# Patient Record
Sex: FEMALE | Race: WHITE | NOT HISPANIC OR LATINO
[De-identification: names, ages, dates, MRNs, and addresses within clinical notes are randomized per-mention and may not be internally consistent; named-entity substitution may affect disease eponyms.]

---

## 2017-05-24 ENCOUNTER — CHARTING TRANS (OUTPATIENT)
Dept: OTHER | Age: 27
End: 2017-05-24

## 2017-05-24 ASSESSMENT — PAIN SCALES - GENERAL: PAINLEVEL_OUTOF10: 1

## 2018-11-03 VITALS
DIASTOLIC BLOOD PRESSURE: 60 MMHG | SYSTOLIC BLOOD PRESSURE: 100 MMHG | WEIGHT: 130 LBS | HEIGHT: 65 IN | TEMPERATURE: 97.9 F | HEART RATE: 72 BPM | OXYGEN SATURATION: 98 % | BODY MASS INDEX: 21.66 KG/M2 | RESPIRATION RATE: 16 BRPM

## 2021-01-20 ENCOUNTER — IMMUNIZATION (OUTPATIENT)
Dept: LAB | Age: 31
End: 2021-01-20

## 2021-01-20 DIAGNOSIS — Z23 NEED FOR VACCINATION: Primary | ICD-10-CM

## 2021-01-20 PROCEDURE — 91301 COVID-19 MODERNA VACCINE: CPT

## 2021-01-20 PROCEDURE — 0011A COVID-19 MODERNA VACCINE: CPT

## 2021-02-21 ENCOUNTER — IMMUNIZATION (OUTPATIENT)
Dept: LAB | Age: 31
End: 2021-02-21

## 2021-02-21 DIAGNOSIS — Z23 NEED FOR VACCINATION: Primary | ICD-10-CM

## 2021-02-21 PROCEDURE — 91301 COVID-19 MODERNA VACCINE: CPT | Performed by: NURSE PRACTITIONER

## 2021-02-21 PROCEDURE — 0012A COVID-19 MODERNA VACCINE: CPT | Performed by: NURSE PRACTITIONER

## 2022-12-03 ENCOUNTER — OFFICE VISIT (OUTPATIENT)
Dept: FAMILY MEDICINE CLINIC | Facility: CLINIC | Age: 32
End: 2022-12-03
Payer: COMMERCIAL

## 2022-12-03 VITALS
DIASTOLIC BLOOD PRESSURE: 60 MMHG | SYSTOLIC BLOOD PRESSURE: 99 MMHG | HEART RATE: 73 BPM | BODY MASS INDEX: 27.16 KG/M2 | OXYGEN SATURATION: 96 % | WEIGHT: 163 LBS | HEIGHT: 65 IN

## 2022-12-03 DIAGNOSIS — Z00.00 ADULT GENERAL MEDICAL EXAMINATION: Primary | ICD-10-CM

## 2022-12-03 DIAGNOSIS — F90.9 ATTENTION DEFICIT HYPERACTIVITY DISORDER (ADHD), UNSPECIFIED ADHD TYPE: ICD-10-CM

## 2022-12-03 DIAGNOSIS — J45.30 MILD PERSISTENT ASTHMA WITHOUT COMPLICATION: ICD-10-CM

## 2022-12-03 DIAGNOSIS — F32.A ANXIETY AND DEPRESSION: ICD-10-CM

## 2022-12-03 DIAGNOSIS — F51.01 PRIMARY INSOMNIA: ICD-10-CM

## 2022-12-03 DIAGNOSIS — F41.9 ANXIETY AND DEPRESSION: ICD-10-CM

## 2022-12-03 RX ORDER — ALBUTEROL SULFATE 90 UG/1
2 AEROSOL, METERED RESPIRATORY (INHALATION) EVERY 6 HOURS PRN
Qty: 1 EACH | Refills: 2 | Status: SHIPPED | OUTPATIENT
Start: 2022-12-03

## 2022-12-03 RX ORDER — FLUOXETINE HYDROCHLORIDE 40 MG/1
40 CAPSULE ORAL DAILY
COMMUNITY
Start: 2022-11-17 | End: 2022-12-03

## 2022-12-03 RX ORDER — BUDESONIDE AND FORMOTEROL FUMARATE DIHYDRATE 160; 4.5 UG/1; UG/1
2 AEROSOL RESPIRATORY (INHALATION) 2 TIMES DAILY
COMMUNITY
End: 2022-12-03

## 2022-12-03 RX ORDER — NORETHINDRONE ACETATE AND ETHINYL ESTRADIOL, ETHINYL ESTRADIOL AND FERROUS FUMARATE 1MG-10(24)
1 KIT ORAL DAILY
COMMUNITY
Start: 2022-11-17

## 2022-12-03 RX ORDER — ZOLPIDEM TARTRATE 10 MG/1
10 TABLET ORAL NIGHTLY PRN
COMMUNITY
Start: 2022-11-17

## 2022-12-03 RX ORDER — ALBUTEROL SULFATE 90 UG/1
2 AEROSOL, METERED RESPIRATORY (INHALATION) EVERY 6 HOURS PRN
COMMUNITY
End: 2022-12-03

## 2022-12-03 RX ORDER — CLONAZEPAM 0.5 MG/1
0.5 TABLET ORAL DAILY PRN
COMMUNITY
Start: 2022-11-17

## 2022-12-03 RX ORDER — BUDESONIDE AND FORMOTEROL FUMARATE DIHYDRATE 160; 4.5 UG/1; UG/1
2 AEROSOL RESPIRATORY (INHALATION) 2 TIMES DAILY
Qty: 1 EACH | Refills: 2 | Status: SHIPPED | OUTPATIENT
Start: 2022-12-03

## 2022-12-03 RX ORDER — METHYLPHENIDATE HYDROCHLORIDE 18 MG/1
TABLET, EXTENDED RELEASE ORAL
COMMUNITY
Start: 2022-11-22

## 2022-12-03 RX ORDER — BUDESONIDE AND FORMOTEROL FUMARATE DIHYDRATE 160; 4.5 UG/1; UG/1
2 AEROSOL RESPIRATORY (INHALATION) 2 TIMES DAILY
Qty: 1 EACH | Refills: 2 | Status: SHIPPED | OUTPATIENT
Start: 2022-12-03 | End: 2022-12-03

## 2022-12-03 RX ORDER — FLUOXETINE HYDROCHLORIDE 20 MG/1
40 CAPSULE ORAL EVERY MORNING
COMMUNITY
Start: 2022-10-14

## 2023-01-06 ENCOUNTER — LAB ENCOUNTER (OUTPATIENT)
Dept: LAB | Facility: HOSPITAL | Age: 33
End: 2023-01-06
Attending: NURSE PRACTITIONER
Payer: COMMERCIAL

## 2023-01-06 DIAGNOSIS — Z00.00 ADULT GENERAL MEDICAL EXAMINATION: ICD-10-CM

## 2023-01-06 LAB
ALBUMIN SERPL-MCNC: 4 G/DL (ref 3.4–5)
ALBUMIN/GLOB SERPL: 1 {RATIO} (ref 1–2)
ALP LIVER SERPL-CCNC: 67 U/L
ALT SERPL-CCNC: 15 U/L
ANION GAP SERPL CALC-SCNC: 4 MMOL/L (ref 0–18)
AST SERPL-CCNC: 10 U/L (ref 15–37)
BASOPHILS # BLD AUTO: 0.11 X10(3) UL (ref 0–0.2)
BASOPHILS NFR BLD AUTO: 1.4 %
BILIRUB SERPL-MCNC: 0.3 MG/DL (ref 0.1–2)
BUN BLD-MCNC: 10 MG/DL (ref 7–18)
BUN/CREAT SERPL: 12.2 (ref 10–20)
CALCIUM BLD-MCNC: 9.3 MG/DL (ref 8.5–10.1)
CHLORIDE SERPL-SCNC: 106 MMOL/L (ref 98–112)
CHOLEST SERPL-MCNC: 221 MG/DL (ref ?–200)
CO2 SERPL-SCNC: 28 MMOL/L (ref 21–32)
CREAT BLD-MCNC: 0.82 MG/DL
DEPRECATED RDW RBC AUTO: 45.1 FL (ref 35.1–46.3)
EOSINOPHIL # BLD AUTO: 0.34 X10(3) UL (ref 0–0.7)
EOSINOPHIL NFR BLD AUTO: 4.4 %
ERYTHROCYTE [DISTWIDTH] IN BLOOD BY AUTOMATED COUNT: 12.4 % (ref 11–15)
EST. AVERAGE GLUCOSE BLD GHB EST-MCNC: 105 MG/DL (ref 68–126)
FASTING PATIENT LIPID ANSWER: YES
FASTING STATUS PATIENT QL REPORTED: YES
GFR SERPLBLD BASED ON 1.73 SQ M-ARVRAT: 97 ML/MIN/1.73M2 (ref 60–?)
GLOBULIN PLAS-MCNC: 4 G/DL (ref 2.8–4.4)
GLUCOSE BLD-MCNC: 90 MG/DL (ref 70–99)
HBA1C MFR BLD: 5.3 % (ref ?–5.7)
HCT VFR BLD AUTO: 40.1 %
HDLC SERPL-MCNC: 81 MG/DL (ref 40–59)
HGB BLD-MCNC: 13.3 G/DL
IMM GRANULOCYTES # BLD AUTO: 0.02 X10(3) UL (ref 0–1)
IMM GRANULOCYTES NFR BLD: 0.3 %
LDLC SERPL CALC-MCNC: 128 MG/DL (ref ?–100)
LYMPHOCYTES # BLD AUTO: 2.11 X10(3) UL (ref 1–4)
LYMPHOCYTES NFR BLD AUTO: 27.1 %
MCH RBC QN AUTO: 32.4 PG (ref 26–34)
MCHC RBC AUTO-ENTMCNC: 33.2 G/DL (ref 31–37)
MCV RBC AUTO: 97.8 FL
MONOCYTES # BLD AUTO: 0.39 X10(3) UL (ref 0.1–1)
MONOCYTES NFR BLD AUTO: 5 %
NEUTROPHILS # BLD AUTO: 4.83 X10 (3) UL (ref 1.5–7.7)
NEUTROPHILS # BLD AUTO: 4.83 X10(3) UL (ref 1.5–7.7)
NEUTROPHILS NFR BLD AUTO: 61.8 %
NONHDLC SERPL-MCNC: 140 MG/DL (ref ?–130)
OSMOLALITY SERPL CALC.SUM OF ELEC: 285 MOSM/KG (ref 275–295)
PLATELET # BLD AUTO: 352 10(3)UL (ref 150–450)
POTASSIUM SERPL-SCNC: 4.6 MMOL/L (ref 3.5–5.1)
PROT SERPL-MCNC: 8 G/DL (ref 6.4–8.2)
RBC # BLD AUTO: 4.1 X10(6)UL
SODIUM SERPL-SCNC: 138 MMOL/L (ref 136–145)
TRIGL SERPL-MCNC: 68 MG/DL (ref 30–149)
TSI SER-ACNC: 1.6 MIU/ML (ref 0.36–3.74)
VLDLC SERPL CALC-MCNC: 12 MG/DL (ref 0–30)
WBC # BLD AUTO: 7.8 X10(3) UL (ref 4–11)

## 2023-01-06 PROCEDURE — 85025 COMPLETE CBC W/AUTO DIFF WBC: CPT

## 2023-01-06 PROCEDURE — 36415 COLL VENOUS BLD VENIPUNCTURE: CPT

## 2023-01-06 PROCEDURE — 83036 HEMOGLOBIN GLYCOSYLATED A1C: CPT

## 2023-01-06 PROCEDURE — 80061 LIPID PANEL: CPT

## 2023-01-06 PROCEDURE — 80053 COMPREHEN METABOLIC PANEL: CPT

## 2023-01-06 PROCEDURE — 84443 ASSAY THYROID STIM HORMONE: CPT

## 2023-01-19 ENCOUNTER — OFFICE VISIT (OUTPATIENT)
Dept: FAMILY MEDICINE CLINIC | Facility: CLINIC | Age: 33
End: 2023-01-19
Payer: COMMERCIAL

## 2023-01-19 VITALS
OXYGEN SATURATION: 98 % | BODY MASS INDEX: 26.33 KG/M2 | SYSTOLIC BLOOD PRESSURE: 108 MMHG | HEIGHT: 65 IN | TEMPERATURE: 99 F | HEART RATE: 81 BPM | DIASTOLIC BLOOD PRESSURE: 76 MMHG | WEIGHT: 158 LBS

## 2023-01-19 DIAGNOSIS — F15.93 WITHDRAWAL FROM OTHER STIMULANT DRUG (HCC): ICD-10-CM

## 2023-01-19 DIAGNOSIS — R51.9 DAILY HEADACHE: Primary | ICD-10-CM

## 2023-01-19 DIAGNOSIS — R42 VERTIGO: ICD-10-CM

## 2023-01-19 PROCEDURE — 3078F DIAST BP <80 MM HG: CPT | Performed by: NURSE PRACTITIONER

## 2023-01-19 PROCEDURE — 3074F SYST BP LT 130 MM HG: CPT | Performed by: NURSE PRACTITIONER

## 2023-01-19 PROCEDURE — 99214 OFFICE O/P EST MOD 30 MIN: CPT | Performed by: NURSE PRACTITIONER

## 2023-01-19 PROCEDURE — 3008F BODY MASS INDEX DOCD: CPT | Performed by: NURSE PRACTITIONER

## 2023-01-19 RX ORDER — FLUTICASONE PROPIONATE 50 MCG
2 SPRAY, SUSPENSION (ML) NASAL DAILY
Qty: 1 EACH | Refills: 0 | Status: SHIPPED | OUTPATIENT
Start: 2023-01-19

## 2023-01-19 RX ORDER — ZOLPIDEM TARTRATE 12.5 MG/1
12.5 TABLET, FILM COATED, EXTENDED RELEASE ORAL NIGHTLY PRN
COMMUNITY
Start: 2022-12-16

## 2023-01-19 RX ORDER — MECLIZINE HCL 12.5 MG/1
12.5 TABLET ORAL 3 TIMES DAILY PRN
Qty: 30 TABLET | Refills: 0 | Status: SHIPPED | OUTPATIENT
Start: 2023-01-19

## 2023-01-19 RX ORDER — METHYLPREDNISOLONE 4 MG/1
TABLET ORAL
Qty: 1 EACH | Refills: 0 | Status: SHIPPED | OUTPATIENT
Start: 2023-01-19

## 2023-01-19 RX ORDER — METHYLPHENIDATE HYDROCHLORIDE 54 MG/1
TABLET ORAL
COMMUNITY
Start: 2022-12-26

## 2023-01-19 RX ORDER — AMOXICILLIN AND CLAVULANATE POTASSIUM 875; 125 MG/1; MG/1
1 TABLET, FILM COATED ORAL 2 TIMES DAILY WITH MEALS
COMMUNITY
Start: 2023-01-14

## 2023-01-26 ENCOUNTER — OFFICE VISIT (OUTPATIENT)
Dept: FAMILY MEDICINE CLINIC | Facility: CLINIC | Age: 33
End: 2023-01-26
Payer: COMMERCIAL

## 2023-01-26 VITALS
DIASTOLIC BLOOD PRESSURE: 82 MMHG | WEIGHT: 156.81 LBS | OXYGEN SATURATION: 98 % | BODY MASS INDEX: 26.13 KG/M2 | RESPIRATION RATE: 18 BRPM | SYSTOLIC BLOOD PRESSURE: 106 MMHG | HEIGHT: 65 IN | HEART RATE: 72 BPM

## 2023-01-26 DIAGNOSIS — L56.8 PHOTOSENSITIVITY: ICD-10-CM

## 2023-01-26 DIAGNOSIS — R42 VERTIGO: Primary | ICD-10-CM

## 2023-01-26 DIAGNOSIS — G47.19 EXCESSIVE DAYTIME SLEEPINESS: ICD-10-CM

## 2023-01-26 PROCEDURE — 99214 OFFICE O/P EST MOD 30 MIN: CPT | Performed by: NURSE PRACTITIONER

## 2023-01-26 PROCEDURE — 3074F SYST BP LT 130 MM HG: CPT | Performed by: NURSE PRACTITIONER

## 2023-01-26 PROCEDURE — 3008F BODY MASS INDEX DOCD: CPT | Performed by: NURSE PRACTITIONER

## 2023-01-26 PROCEDURE — 3079F DIAST BP 80-89 MM HG: CPT | Performed by: NURSE PRACTITIONER

## 2023-04-10 ENCOUNTER — OFFICE VISIT (OUTPATIENT)
Dept: FAMILY MEDICINE CLINIC | Facility: CLINIC | Age: 33
End: 2023-04-10
Payer: COMMERCIAL

## 2023-04-10 ENCOUNTER — HOSPITAL ENCOUNTER (OUTPATIENT)
Dept: GENERAL RADIOLOGY | Age: 33
Discharge: HOME | End: 2023-04-10
Attending: NURSE PRACTITIONER
Payer: COMMERCIAL

## 2023-04-10 VITALS
BODY MASS INDEX: 25.16 KG/M2 | HEART RATE: 105 BPM | DIASTOLIC BLOOD PRESSURE: 72 MMHG | RESPIRATION RATE: 18 BRPM | TEMPERATURE: 98 F | HEIGHT: 65 IN | WEIGHT: 151 LBS | OXYGEN SATURATION: 97 % | SYSTOLIC BLOOD PRESSURE: 118 MMHG

## 2023-04-10 DIAGNOSIS — K59.01 SLOW TRANSIT CONSTIPATION: Primary | ICD-10-CM

## 2023-04-10 DIAGNOSIS — Z01.84 IMMUNITY STATUS TESTING: ICD-10-CM

## 2023-04-10 DIAGNOSIS — R10.84 GENERALIZED ABDOMINAL PAIN: ICD-10-CM

## 2023-04-10 DIAGNOSIS — K59.01 SLOW TRANSIT CONSTIPATION: ICD-10-CM

## 2023-04-10 PROCEDURE — 3078F DIAST BP <80 MM HG: CPT | Performed by: NURSE PRACTITIONER

## 2023-04-10 PROCEDURE — 3008F BODY MASS INDEX DOCD: CPT | Performed by: NURSE PRACTITIONER

## 2023-04-10 PROCEDURE — 74018 RADEX ABDOMEN 1 VIEW: CPT | Performed by: NURSE PRACTITIONER

## 2023-04-10 PROCEDURE — 3074F SYST BP LT 130 MM HG: CPT | Performed by: NURSE PRACTITIONER

## 2023-04-10 PROCEDURE — 99213 OFFICE O/P EST LOW 20 MIN: CPT | Performed by: NURSE PRACTITIONER

## 2023-04-10 NOTE — PATIENT INSTRUCTIONS
COLON CLEANSE:    Recommend starting with 1-2 Fleets enemas (or generic equivalent) as per package instructions. Start with one enema, if no significant bowel movement within 3-4 hours, repeat enema. (An alternative to the enema is Glycerine suppositories.)    If bowel movement occurs then drink 1/2-1 bottle of magnesium citrate. If no significant stool cleanse or bowel movements occur may repeat process the next day. If colon cleanse successful, start MiraLAX over-the-counter laxative (or generic equivalent), twice a day for 2 weeks then continue for maintenance daily to twice a day as needed. (You may try glycerine suppositories as well in place of or in addition to the enemas if preferred.)    Increase your fluid and dietary fiber intake. You may take over-the-counter fiber supplements such as Benefiber, Citrucel or Metamucil powder. Follow-up as needed.     (All the above recommendations are available over-the-counter.)

## 2023-04-14 ENCOUNTER — TELEPHONE (OUTPATIENT)
Dept: FAMILY MEDICINE CLINIC | Facility: CLINIC | Age: 33
End: 2023-04-14

## 2023-04-14 NOTE — TELEPHONE ENCOUNTER
I spoke with pt, discussed she has been constipated x 6 weeks. Saw Beryle Argyle on Monday, colon cleanse recommended. 48 hrs ago, started pooping nonstop. Watery diarrhea started around wed 3 pm. Stomach crampy, denies lightheadedness or dizziness. Yesterday bm 4 times - 3 for 10 min. Today once. Miralax with taco seeds - 1 cap bid, started Monday night. [employer] brand benefiber - 1 tablespoon bid. Has another OTC fiber supplement. Takes mag citrate tabs daily since monday. Had about 3 bottle of mag citrate since wed. Advised to stop all mag citrate. Continue miralax bid, benefiber once daily. And do either taco seeds or other fiber supplement. If any blood in stool, worsening diarrhea, syncope - to ER. Otherwise, call us on Monday with update. Patient verbalizes understanding and agrees with plan.

## 2023-04-17 ENCOUNTER — LAB ENCOUNTER (OUTPATIENT)
Dept: LAB | Age: 33
End: 2023-04-17
Attending: NURSE PRACTITIONER
Payer: COMMERCIAL

## 2023-04-17 DIAGNOSIS — Z01.84 IMMUNITY STATUS TESTING: ICD-10-CM

## 2023-04-17 LAB
HBV SURFACE AB SER QL: REACTIVE
HBV SURFACE AB SERPL IA-ACNC: 550.32 MIU/ML
RUBV IGG SER QL: POSITIVE
RUBV IGG SER-ACNC: 93.7 IU/ML (ref 10–?)

## 2023-04-17 PROCEDURE — 86706 HEP B SURFACE ANTIBODY: CPT | Performed by: NURSE PRACTITIONER

## 2023-04-17 PROCEDURE — 86480 TB TEST CELL IMMUN MEASURE: CPT | Performed by: NURSE PRACTITIONER

## 2023-04-17 PROCEDURE — 86735 MUMPS ANTIBODY: CPT | Performed by: NURSE PRACTITIONER

## 2023-04-17 PROCEDURE — 86762 RUBELLA ANTIBODY: CPT | Performed by: NURSE PRACTITIONER

## 2023-04-17 PROCEDURE — 86765 RUBEOLA ANTIBODY: CPT | Performed by: NURSE PRACTITIONER

## 2023-04-17 PROCEDURE — 86787 VARICELLA-ZOSTER ANTIBODY: CPT | Performed by: NURSE PRACTITIONER

## 2023-04-19 LAB
M TB IFN-G CD4+ T-CELLS BLD-ACNC: 0 IU/ML
M TB TUBERC IFN-G BLD QL: NEGATIVE
M TB TUBERC IGNF/MITOGEN IGNF CONTROL: >10 IU/ML
MEV IGG SER-ACNC: 102 AU/ML (ref 16.5–?)
MUV IGG SER IA-ACNC: 40.8 AU/ML (ref 11–?)
QFT TB1 AG MINUS NIL: 0.05 IU/ML
QFT TB2 AG MINUS NIL: 0.05 IU/ML
VZV IGG SER IA-ACNC: 470.1 (ref 165–?)

## 2023-04-20 ENCOUNTER — TELEPHONE (OUTPATIENT)
Dept: FAMILY MEDICINE CLINIC | Facility: CLINIC | Age: 33
End: 2023-04-20

## 2023-04-20 NOTE — TELEPHONE ENCOUNTER
Please notify patient her sleep study is not approved by insurance. I attempted a [id number] but they are not able to approve it. I recommend she keep her appointment with Dr. Rae Santamaria to discuss her sleep issues and he may be able to get that or a different test approved for her in the future. Thanks.

## 2023-05-08 ENCOUNTER — HOSPITAL ENCOUNTER (OUTPATIENT)
Dept: GENERAL RADIOLOGY | Age: 33
Discharge: HOME | End: 2023-05-08
Attending: NURSE PRACTITIONER
Payer: COMMERCIAL

## 2023-05-08 ENCOUNTER — OFFICE VISIT (OUTPATIENT)
Dept: FAMILY MEDICINE CLINIC | Facility: CLINIC | Age: 33
End: 2023-05-08
Payer: COMMERCIAL

## 2023-05-08 VITALS
SYSTOLIC BLOOD PRESSURE: 106 MMHG | WEIGHT: 155.38 LBS | BODY MASS INDEX: 25.89 KG/M2 | OXYGEN SATURATION: 99 % | RESPIRATION RATE: 18 BRPM | TEMPERATURE: 98 F | HEART RATE: 90 BPM | HEIGHT: 65 IN | DIASTOLIC BLOOD PRESSURE: 80 MMHG

## 2023-05-08 DIAGNOSIS — R06.09 OTHER FORM OF DYSPNEA: ICD-10-CM

## 2023-05-08 DIAGNOSIS — R07.89 CHEST WALL PAIN: ICD-10-CM

## 2023-05-08 DIAGNOSIS — M54.2 NECK PAIN: Primary | ICD-10-CM

## 2023-05-08 PROCEDURE — 99213 OFFICE O/P EST LOW 20 MIN: CPT | Performed by: NURSE PRACTITIONER

## 2023-05-08 PROCEDURE — 3074F SYST BP LT 130 MM HG: CPT | Performed by: NURSE PRACTITIONER

## 2023-05-08 PROCEDURE — 3079F DIAST BP 80-89 MM HG: CPT | Performed by: NURSE PRACTITIONER

## 2023-05-08 PROCEDURE — 71046 X-RAY EXAM CHEST 2 VIEWS: CPT | Performed by: NURSE PRACTITIONER

## 2023-05-08 PROCEDURE — 3008F BODY MASS INDEX DOCD: CPT | Performed by: NURSE PRACTITIONER

## 2023-05-08 RX ORDER — LISDEXAMFETAMINE DIMESYLATE 20 MG/1
20 CAPSULE ORAL EVERY MORNING
COMMUNITY
Start: 2023-04-10

## 2023-05-08 RX ORDER — INDOMETHACIN 50 MG/1
50 CAPSULE ORAL 2 TIMES DAILY
COMMUNITY
Start: 2023-05-04 | End: 2023-05-08

## 2023-05-08 RX ORDER — HYDROCODONE BITARTRATE AND ACETAMINOPHEN 10; 325 MG/1; MG/1
1 TABLET ORAL EVERY 6 HOURS PRN
COMMUNITY
Start: 2023-05-04

## 2023-05-08 RX ORDER — IBUPROFEN 600 MG/1
600 TABLET ORAL EVERY 8 HOURS PRN
Qty: 30 TABLET | Refills: 0 | Status: SHIPPED | OUTPATIENT
Start: 2023-05-08

## 2023-05-08 RX ORDER — CHLORZOXAZONE 375 MG/1
1 TABLET ORAL EVERY 8 HOURS PRN
COMMUNITY
Start: 2023-05-04

## 2023-05-12 ENCOUNTER — APPOINTMENT (OUTPATIENT)
Dept: OTHER | Facility: HOSPITAL | Age: 33
End: 2023-05-12
Attending: PREVENTIVE MEDICINE

## 2023-05-17 ENCOUNTER — OFFICE VISIT (OUTPATIENT)
Dept: NEUROLOGY | Facility: CLINIC | Age: 33
End: 2023-05-17
Payer: COMMERCIAL

## 2023-05-17 VITALS
HEART RATE: 65 BPM | DIASTOLIC BLOOD PRESSURE: 75 MMHG | WEIGHT: 155 LBS | HEIGHT: 65 IN | BODY MASS INDEX: 25.83 KG/M2 | SYSTOLIC BLOOD PRESSURE: 110 MMHG

## 2023-05-17 DIAGNOSIS — G43.111 INTRACTABLE MIGRAINE WITH AURA WITH STATUS MIGRAINOSUS: ICD-10-CM

## 2023-05-17 DIAGNOSIS — G43.809 VESTIBULAR MIGRAINE: Primary | ICD-10-CM

## 2023-05-17 DIAGNOSIS — R53.83 OTHER FATIGUE: ICD-10-CM

## 2023-05-17 DIAGNOSIS — G47.10 HYPERSOMNIA: ICD-10-CM

## 2023-05-17 PROCEDURE — 3008F BODY MASS INDEX DOCD: CPT | Performed by: OTHER

## 2023-05-17 PROCEDURE — 3074F SYST BP LT 130 MM HG: CPT | Performed by: OTHER

## 2023-05-17 PROCEDURE — 3078F DIAST BP <80 MM HG: CPT | Performed by: OTHER

## 2023-05-17 PROCEDURE — 99204 OFFICE O/P NEW MOD 45 MIN: CPT | Performed by: OTHER

## 2023-07-30 ENCOUNTER — HOSPITAL ENCOUNTER (OUTPATIENT)
Age: 33
Discharge: HOME | End: 2023-07-30
Payer: COMMERCIAL

## 2023-07-30 ENCOUNTER — APPOINTMENT (OUTPATIENT)
Dept: GENERAL RADIOLOGY | Age: 33
End: 2023-07-30
Attending: NURSE PRACTITIONER
Payer: COMMERCIAL

## 2023-07-30 VITALS
SYSTOLIC BLOOD PRESSURE: 113 MMHG | RESPIRATION RATE: 18 BRPM | HEART RATE: 85 BPM | OXYGEN SATURATION: 100 % | DIASTOLIC BLOOD PRESSURE: 72 MMHG | TEMPERATURE: 98 F

## 2023-07-30 DIAGNOSIS — R07.81 RIB PAIN: Primary | ICD-10-CM

## 2023-07-30 DIAGNOSIS — S22.31XA CLOSED FRACTURE OF ONE RIB OF RIGHT SIDE, INITIAL ENCOUNTER: ICD-10-CM

## 2023-07-30 PROCEDURE — 99213 OFFICE O/P EST LOW 20 MIN: CPT | Performed by: NURSE PRACTITIONER

## 2023-07-30 PROCEDURE — 71101 X-RAY EXAM UNILAT RIBS/CHEST: CPT | Performed by: NURSE PRACTITIONER

## 2023-07-30 NOTE — ED INITIAL ASSESSMENT (HPI)
Pt states her R posterior rib popped out for the 3rd time in past 1 mos. States chiropractor popped it back in place 1 week ago. States same rib popped out yesterday. No injury or falls. No SOB.

## 2023-07-31 ENCOUNTER — TELEPHONE (OUTPATIENT)
Dept: ORTHOPEDICS CLINIC | Facility: CLINIC | Age: 33
End: 2023-07-31

## 2023-07-31 ENCOUNTER — OFFICE VISIT (OUTPATIENT)
Dept: FAMILY MEDICINE CLINIC | Facility: CLINIC | Age: 33
End: 2023-07-31
Payer: COMMERCIAL

## 2023-07-31 VITALS
OXYGEN SATURATION: 98 % | TEMPERATURE: 98 F | HEART RATE: 93 BPM | HEIGHT: 65 IN | RESPIRATION RATE: 16 BRPM | SYSTOLIC BLOOD PRESSURE: 100 MMHG | WEIGHT: 151.81 LBS | BODY MASS INDEX: 25.29 KG/M2 | DIASTOLIC BLOOD PRESSURE: 64 MMHG

## 2023-07-31 DIAGNOSIS — R07.81 RIB PAIN: ICD-10-CM

## 2023-07-31 DIAGNOSIS — S22.31XD CLOSED FRACTURE OF ONE RIB OF RIGHT SIDE WITH ROUTINE HEALING, SUBSEQUENT ENCOUNTER: Primary | ICD-10-CM

## 2023-07-31 PROCEDURE — 3008F BODY MASS INDEX DOCD: CPT | Performed by: NURSE PRACTITIONER

## 2023-07-31 PROCEDURE — 99213 OFFICE O/P EST LOW 20 MIN: CPT | Performed by: NURSE PRACTITIONER

## 2023-07-31 PROCEDURE — 3074F SYST BP LT 130 MM HG: CPT | Performed by: NURSE PRACTITIONER

## 2023-07-31 PROCEDURE — 3078F DIAST BP <80 MM HG: CPT | Performed by: NURSE PRACTITIONER

## 2023-07-31 RX ORDER — NAPROXEN 500 MG/1
500 TABLET ORAL 2 TIMES DAILY WITH MEALS
Qty: 28 TABLET | Refills: 0 | Status: SHIPPED | OUTPATIENT
Start: 2023-07-31 | End: 2023-08-14

## 2023-08-03 ENCOUNTER — OFFICE VISIT (OUTPATIENT)
Dept: ORTHOPEDICS CLINIC | Facility: CLINIC | Age: 33
End: 2023-08-03

## 2023-08-03 DIAGNOSIS — S22.31XA CLOSED FRACTURE OF ONE RIB OF RIGHT SIDE, INITIAL ENCOUNTER: Primary | ICD-10-CM

## 2023-08-03 PROCEDURE — 99244 OFF/OP CNSLTJ NEW/EST MOD 40: CPT | Performed by: ORTHOPAEDIC SURGERY

## 2023-08-03 NOTE — PROGRESS NOTES
NURSING INTAKE COMMENTS: Patient presents with:  Fracture: Consult for Fx 10 th Rib Right side, T6 popping out . 3 weeks ago Felt pain on right lower scapula. XR in system. right shoulder  Pain 3-9/10  with decrease ROM. On and off tingling-No injury- XR in system      HPI: This 28year old female presents today with complaints of right shoulder thoracic back and rib pain. Symptoms started about 3 to 4 weeks ago. No specific injury. She saw chiropractor and had 2 separate rib adjustments which seemed to help temporarily. She feels though something is clicking in and out of place along the thoracic spine. 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 She is right-hand dominant. No numbness or tingling. She feels posterior shoulder and rib pain when reaching outward and overhead. She feels she may have injured the area during intercourse. Past Medical History:   Diagnosis Date    ADHD     Anxiety     Asthma     Depression      Past Surgical History:   Procedure Laterality Date    KNEE SURGERY Right      Current Outpatient Medications   Medication Sig Dispense Refill    naproxen 500 MG Oral Tab Take 1 tablet (500 mg total) by mouth 2 (two) times daily with meals for 14 days. 28 tablet 0    VYVANSE 20 MG Oral Cap Take 1 capsule (20 mg total) by mouth every morning. Zolpidem Tartrate ER 12.5 MG Oral Tab CR Take 1 tablet (12.5 mg total) by mouth nightly as needed. AT BEDTIME      clonazePAM 0.5 MG Oral Tab Take 1 tablet (0.5 mg total) by mouth daily as needed. FLUoxetine 20 MG Oral Cap Take 2 capsules (40 mg total) by mouth every morning. LO LOESTRIN FE 1 MG-10 MCG / 10 MCG Oral Tab Take 1 tablet by mouth daily. albuterol 108 (90 Base) MCG/ACT Inhalation Aero Soln Inhale 2 puffs into the lungs every 6 (six) hours as needed for Wheezing or Shortness of Breath. 1 each 2    Budesonide-Formoterol Fumarate 160-4.5 MCG/ACT Inhalation Aerosol Inhale 2 puffs into the lungs 2 (two) times daily.  1 each 2     No Known Allergies  History reviewed. No pertinent family history. Social History    Occupational History      Not on file    Tobacco Use      Smoking status: Never      Smokeless tobacco: Never    Vaping Use      Vaping Use: Never used    Substance and Sexual Activity      Alcohol use: Yes        Comment: occ      Drug use: Never      Sexual activity: Not on file       Review of Systems:  GENERAL: denies fevers, chills, night sweats, fatigue, unintentional weight loss/gain  SKIN: denies skin lesions, open sores, rash  HEENT:denies recent vision change, new nasal congestion,hearing loss, tinnitus, sore throat, headaches  RESPIRATORY: denies new shortness of breath, cough, asthma, wheezing  CARDIOVASCULAR: denies chest pain, leg cramps with exertion, palpitations, leg swelling  GI: denies abdominal pain, nausea, vomiting, diarrhea, constipation, hematochezia, worsening heartburn or stomach ulcers  : denies dysuria, hematuria, incontinence, increased frequency, urgency, difficulty urinating  MUSCULOSKELETAL: denies musculoskeletal complaints other than in HPI  NEURO: denies numbness, tingling, weakness, balance issues, dizziness, memory loss  PSYCHIATRIC: denies Hx of depression, anxiety, other psychiatric disorders  HEMATOLOGIC: denies blood clots, anemia, blood clotting disorders, blood transfusion  ENDOCRINE: denies autoimmune disease, thyroid issues, or diabetes  ALLERGY: denies asthma, seasonal allergies    Physical Examination:    LMP 07/09/2023 (Approximate)   Constitutional: appears well hydrated, alert and responsive, no acute distress noted  Extremities: Right shoulder nontender nonswollen. AC joint and clavicle nontender. Scapula and optically tender. Periscapular musculature minimally tender to palpation. Minimal tenderness over the serratus musculature and posterior ribs in the rib 8 through 12 region on the right. No palpable crepitus.   Right shoulder range of motion and strength is normal.  Neurological: Right hand light touch and pinprick sensory exam normal. Lateral shoulder light touch and pinprick sensory examination normal.  strength,wrist extension, biceps, triceps, and shoulder abduction strength 5 out of 5. Alondra sign negative. No obvious atrophy of the hand. Imaging:   XR RIBS WITH CHEST (3 VIEWS), RIGHT  (CPT=71101)    Result Date: 7/30/2023  PROCEDURE: XR RIBS WITH CHEST (3 VIEWS), RIGHT (CPT=71101)  COMPARISON: Former[facility] named [facility], XR CHEST PA + LAT CHEST (TDD=[id number]), 5/08/2023, 5:03 PM.  INDICATIONS: Intermittent right-sided mid back pain for 3 weeks. No known injury. TECHNIQUE:   Four views. FINDINGS:   BONES: Nondisplaced fracture posterior margin right 10th rib SOFT TISSUES: No acute cardiopulmonary disease LUNGS: Normal  PLEURA: Normal. OTHER: S-shaped scoliosis dorsal spine         CONCLUSION:  1. No acute cardiopulmonary disease. 2. Nondisplaced fracture posterior margin right 10th rib    Dictated by (CST): Chino Patton MD on 7/30/2023 at 4:05 PM     Finalized by (CST): Chino Patton MD on 7/30/2023 at 4:07 PM             Labs:  Lab Results   Component Value Date    WBC 7.8 01/06/2023    HGB 13.3 01/06/2023    .0 01/06/2023      Lab Results   Component Value Date    GLU 90 01/06/2023    BUN 10 01/06/2023    CREATSERUM 0.82 01/06/2023        Assessment and Plan:  Diagnoses and all orders for this visit:    Closed fracture of one rib of right side, initial encounter        Assessment: Right-sided rib pain, findings suggestive of 1/10 rib fracture on the right, serratus strain    Plan: I advised no specific treatment at this time. Advised against lifting pushing and pulling with the extremity. Provided an off work note for the next 3 weeks. Follow-up again in 3 weeks at which time we will likely recommend some outpatient physical therapy. Advised icing, oral anti-inflammatory use for now.   She may use a sling for comfort. Follow Up: Return in about 3 weeks (around 8/24/2023).     Tressa Gonsalves MD

## 2023-09-27 ENCOUNTER — NURSE TRIAGE (OUTPATIENT)
Dept: FAMILY MEDICINE CLINIC | Facility: CLINIC | Age: 33
End: 2023-09-27

## 2023-09-27 NOTE — TELEPHONE ENCOUNTER
Patient called and LVM. Patient is covid positive. RN LVM for patient to call office back for triage.

## 2023-09-28 NOTE — TELEPHONE ENCOUNTER
S/w Mine who stated after triage she had \" some chest tightness due to her asthma. \" Pt denied using short acting inhaler. Pt directed to  for evaluation but also informed if worsening chest tightness or SOB to proceed to the ED. Reason for Disposition   Chest pain or pressure  (Exception: MILD central chest pain, present only when coughing)    Answer Assessment - Initial Assessment Questions  1. COVID-19 DIAGNOSIS: \"Who made your COVID-19 diagnosis? \" \"Was it confirmed by a positive lab test or self-test?\" If not diagnosed by a doctor (or NP/PA), ask \"Are there lots of cases (community spread) where you live? \" Note: See public health department website, if unsure. Yes, home test positive 9/27/23  2. COVID-19 EXPOSURE: \"Was there any known exposure to COVID before the symptoms began? \" CDC Definition of close contact: within 6 feet (2 meters) for a total of 15 minutes or more over a 24-hour period. N/a  3. ONSET: \"When did the COVID-19 symptoms start? \"       9/25/23   4. WORST SYMPTOM: \"What is your worst symptom? \" (e.g., cough, fever, shortness of breath, muscle aches)      Sore throat 6-7/10  5. COUGH: \"Do you have a cough? \" If Yes, ask: \"How bad is the cough? \"        Yes, dry cough. 6. FEVER: \"Do you have a fever? \" If Yes, ask: \"What is your temperature, how was it measured, and when did it start? \"      Denied  7. RESPIRATORY STATUS: \"Describe your breathing? \" (e.g., shortness of breath, wheezing, unable to speak)       Mild shortness of breath with hx of asthma; pt has not used albuterol. 8. BETTER-SAME-WORSE: \"Are you getting better, staying the same or getting worse compared to yesterday? \"  If getting worse, ask, \"In what way? \"      Better  9. HIGH RISK DISEASE: \"Do you have any chronic medical problems? \" (e.g., asthma, heart or lung disease, weak immune system, obesity, etc.)      Asthma  10. VACCINE: \"Have you had the COVID-19 vaccine? \" If Yes, ask: \"Which one, how many shots, when did you get it? \"        Covid immunization x 2 at least 1 year. 11. BOOSTER: \"Have you received your COVID-19 booster? \" If Yes, ask: \"Which one and when did you get it? \"        Covid x 2 boosters at least 1 year. 12. PREGNANCY: \"Is there any chance you are pregnant? \" \"When was your last menstrual period? \"        Denied; LMP: unknown due to birth control medication. 13. OTHER SYMPTOMS: \"Do you have any other symptoms? \"  (e.g., chills, fatigue, headache, loss of smell or taste, muscle pain, sore throat)        Fatigue, 2-3/10 frontal headache with mild dizziness. 14. O2 SATURATION MONITOR:  \"Do you use an oxygen saturation monitor (pulse oximeter) at home? \" If Yes, ask \"What is your reading (oxygen level) today? \" \"What is your usual oxygen saturation reading? \" (e.g., 95%)        Denied    Protocols used: Coronavirus ([id number]) Diagnosed or [id number]

## 2023-10-14 ENCOUNTER — PATIENT MESSAGE (OUTPATIENT)
Dept: NEUROLOGY | Facility: CLINIC | Age: 33
End: 2023-10-14

## 2023-10-19 ENCOUNTER — OFFICE VISIT (OUTPATIENT)
Dept: NEUROLOGY | Facility: CLINIC | Age: 33
End: 2023-10-19
Payer: COMMERCIAL

## 2023-10-19 VITALS — WEIGHT: 150 LBS | BODY MASS INDEX: 24.99 KG/M2 | HEIGHT: 65 IN

## 2023-10-19 DIAGNOSIS — G43.111 INTRACTABLE MIGRAINE WITH AURA WITH STATUS MIGRAINOSUS: Primary | ICD-10-CM

## 2023-10-19 DIAGNOSIS — G43.809 VESTIBULAR MIGRAINE: ICD-10-CM

## 2023-10-19 PROCEDURE — 99214 OFFICE O/P EST MOD 30 MIN: CPT | Performed by: OTHER

## 2023-10-19 PROCEDURE — 3008F BODY MASS INDEX DOCD: CPT | Performed by: OTHER

## 2023-10-19 RX ORDER — LISDEXAMFETAMINE DIMESYLATE CAPSULES 60 MG/1
60 CAPSULE ORAL EVERY MORNING
COMMUNITY
Start: 2023-09-26

## 2023-10-19 RX ORDER — DIVALPROEX SODIUM 500 MG/1
TABLET, EXTENDED RELEASE ORAL
Qty: 15 TABLET | Refills: 0 | Status: SHIPPED | OUTPATIENT
Start: 2023-10-19 | End: 2023-10-28

## 2023-12-13 ENCOUNTER — PATIENT MESSAGE (OUTPATIENT)
Dept: NEUROLOGY | Facility: CLINIC | Age: 33
End: 2023-12-13

## 2024-01-02 ENCOUNTER — HOSPITAL ENCOUNTER (OUTPATIENT)
Dept: BONE DENSITY | Age: 34
Discharge: HOME | End: 2024-01-02
Attending: NURSE PRACTITIONER
Payer: COMMERCIAL

## 2024-01-02 DIAGNOSIS — S22.31XD CLOSED FRACTURE OF ONE RIB OF RIGHT SIDE WITH ROUTINE HEALING, SUBSEQUENT ENCOUNTER: ICD-10-CM

## 2024-01-02 DIAGNOSIS — M85.80 OSTEOPENIA, UNSPECIFIED LOCATION: Primary | ICD-10-CM

## 2024-01-02 PROCEDURE — 77080 DXA BONE DENSITY AXIAL: CPT | Performed by: NURSE PRACTITIONER

## 2024-01-03 ENCOUNTER — OFFICE VISIT (OUTPATIENT)
Dept: RHEUMATOLOGY | Facility: CLINIC | Age: 34
End: 2024-01-03
Payer: COMMERCIAL

## 2024-01-03 VITALS
HEIGHT: 65 IN | HEART RATE: 79 BPM | SYSTOLIC BLOOD PRESSURE: 133 MMHG | BODY MASS INDEX: 22.99 KG/M2 | DIASTOLIC BLOOD PRESSURE: 87 MMHG | WEIGHT: 138 LBS

## 2024-01-03 DIAGNOSIS — M79.18 MYOFASCIAL PAIN: Primary | ICD-10-CM

## 2024-01-03 PROCEDURE — 3079F DIAST BP 80-89 MM HG: CPT | Performed by: INTERNAL MEDICINE

## 2024-01-03 PROCEDURE — 3075F SYST BP GE 130 - 139MM HG: CPT | Performed by: INTERNAL MEDICINE

## 2024-01-03 PROCEDURE — 3008F BODY MASS INDEX DOCD: CPT | Performed by: INTERNAL MEDICINE

## 2024-01-03 PROCEDURE — 99244 OFF/OP CNSLTJ NEW/EST MOD 40: CPT | Performed by: INTERNAL MEDICINE

## 2024-01-03 RX ORDER — MELOXICAM 15 MG/1
TABLET ORAL
Qty: 30 TABLET | Refills: 2 | Status: SHIPPED | OUTPATIENT
Start: 2024-01-03

## 2024-01-03 RX ORDER — CYCLOBENZAPRINE HCL 5 MG
5 TABLET ORAL NIGHTLY PRN
Qty: 30 TABLET | Refills: 2 | Status: SHIPPED | OUTPATIENT
Start: 2024-01-03

## 2024-01-03 NOTE — PROGRESS NOTES
RHEUMATOLOGY CLINIC- NEW PATIENT    Mine Evans is a 33 year old female.    ASSESSMENT/PLAN:       ICD-10-CM    1. Myofascial pain  M79.18           DISCUSSION:  Patient presents as a new outpatient referral.  At this time, patient does not have features of an underlying connective tissue disease nor inflammatory arthritis.  Therefore, discussed with patient trial of scheduled NSAIDs for 2 weeks as well as cyclobenzaprine.  Patient to follow-up in about a month if symptoms persist.    PLAN:  - Start: Meloxicam (Mobic) 15 mg every day for 2 weeks with food.  After 2 weeks, decrease to as needed up to once daily.  Discussed with patient to avoid other OTC NSAIDs while on this medication given increased risk of side effects including GI upset and GI bleed.  Discussed risk of bleed with concurrent SSRI use.  -May take as needed cyclobenzaprine once nightly  - Consult/evaluation communicated with referring physician/provider.      Patient to follow-up in about a month if symptoms persist.    Víctor Jo DO  1/3/2024   2:15 PM    HPI:     Chief Complaint   Patient presents with    Consult     In July has a back/shoulder injury and family hx of arthritis and osteoporosis       I had the pleasure of seeing Mine Evans on 1/3/2024 as a new outpatient consultation for arthralgias. The patient presents from her PCP.     33 year old female w/ PMH asthma, anxiety/depression, 2013 R knee acl surgery who presents to clinic today.  Patient started noticing pain of her back and shoulders about 6 months ago for which she has been evaluated by orthopedics and was reportedly told that it was likely due to sprain.  She was recommended physical therapy which she is currently undergoing.  She describes the pain as a pinching soreness without associated shooting numbness or pins and needle sensation.  She states that her back can feel tight or stiff and has noted some relief with OTC Excedrin and Bengay.  Symptoms are worsened  by repetitive note movements especially given her work as a .  ROS negative for fever, chills, photosensitivity, rash, sicca symptoms, Raynaud's phenomenon.  Family history notable for a brother, uncle, and dad with arthritis but unknown whether inflammatory versus degenerative.      Current Medications:  Excedrin  Topical Bengay    Medication History:  See above    Interval History:   The above    HISTORY:  Past Medical History:   Diagnosis Date    ADHD     Anxiety     Asthma     Depression       Social Hx Reviewed   Family Hx Reviewed     Medications (Active prior to today's visit):  Current Outpatient Medications   Medication Sig Dispense Refill    Lisdexamfetamine Dimesylate 60 MG Oral Cap Take 1 capsule (60 mg total) by mouth every morning.      Zolpidem Tartrate ER 12.5 MG Oral Tab CR Take 1 tablet (12.5 mg total) by mouth nightly as needed. AT BEDTIME      clonazePAM 0.5 MG Oral Tab Take 1 tablet (0.5 mg total) by mouth daily as needed.      FLUoxetine 20 MG Oral Cap Take 2 capsules (40 mg total) by mouth every morning.      LO LOESTRIN FE 1 MG-10 MCG / 10 MCG Oral Tab Take 1 tablet by mouth daily.      albuterol 108 (90 Base) MCG/ACT Inhalation Aero Soln Inhale 2 puffs into the lungs every 6 (six) hours as needed for Wheezing or Shortness of Breath. 1 each 2    Budesonide-Formoterol Fumarate 160-4.5 MCG/ACT Inhalation Aerosol Inhale 2 puffs into the lungs 2 (two) times daily. 1 each 2    LISDEXAMFETAMINE 50 MG Oral Cap Take 1 capsule (50 mg total) by mouth every morning.         Allergies:  No Known Allergies      ROS:   Review of Systems   Constitutional:  Negative for chills and fever.   HENT:  Negative for congestion, hearing loss, mouth sores, nosebleeds and trouble swallowing.    Eyes:  Negative for photophobia, pain, redness and visual disturbance.   Respiratory:  Negative for cough and shortness of breath.    Cardiovascular:  Negative for chest pain, palpitations and leg swelling.    Gastrointestinal:  Negative for abdominal pain, blood in stool, diarrhea and nausea.   Endocrine: Negative for cold intolerance and heat intolerance.   Genitourinary:  Negative for dysuria, frequency and hematuria.   Musculoskeletal:  Positive for arthralgias, back pain, myalgias and neck pain. Negative for gait problem, joint swelling and neck stiffness.   Skin:  Negative for color change and rash.   Neurological:  Negative for dizziness, weakness, numbness and headaches.   Psychiatric/Behavioral:  Negative for confusion and dysphoric mood.         PHYSICAL EXAM:     Constitutional:  Well developed, Well nourished, No acute distress  HENT:  Normocephalic, Atraumatic, Bilateral external ears normal, Oropharynx moist, No oral exudates.  Neck: Normal range of motion, No tenderness, Supple, No stridor.  Eyes:  PERRL, EOMI, Conjunctiva normal, No discharge.  Respiratory:  Normal breath sounds, No respiratory distress, No wheezing.  Cardiovascular:  Normal heart rate, Normal rhythm, No murmurs, No rubs, No gallops.  GI:  Bowel sounds normal, Soft, No tenderness, No masses, No pulsatile masses.  : No CVA tenderness.  Musculoskeletal: Negative Spurling testing, negative Jessica's empty can testing, negative Hawking test.  Normal range of motion of the bilateral shoulders.  A comprehensive 28 count joint exam was done and was negative for swelling or tenderness except as noted. Inspections for misalignment, asymmetry, crepitation, defects, tenderness, masses, nodules, effusions, range of motion, and stability in the upper and lower extremities bilaterally are all normal unless noted.           Integument:  Warm, Dry, No erythema, No rash.  Lymphatic:  No lymphadenopathy noted.  Neurologic:  Alert & oriented x 3, Normal motor function, Normal sensory function, No focal deficits noted.  Psychiatric:  Affect normal, Judgment normal, Mood normal.    LABS:     Prior lab work reviewed and notable for:    1/6/2023 TSH 1.6  WNL  Hemoglobin A1c 5.3  CMP with normal renal function and normal hepatic function  CBC without cytopenias  Imaging:       DEXA: 1/2/24    Narrative   PROCEDURE:  XR DEXA BONE DENSITOMETRY (CPT=77080)     COMPARISON:  None.     INDICATIONS:  S22.31XD Closed fracture of one rib of right side with routine healing, subsequent encounter     PATIENT STATED HISTORY: (As transcribed by Technologist)  Closed fracture of right rib          LUMBAR SPINE ANALYSIS RESULTS:      Bone mineral density (BMD) (g/cm2):  0.934    Lumbar T-Score:  -1.0      % young normals:  89      % age matched controls:  90      Change from prior spine examination:  NA              TOTAL HIP ANALYSIS RESULTS:        Bone mineral density (BMD) (g/cm2):  0.871      Total Hip T-Score:  -0.6      % young normals:  92      % age matched controls:  93      Change from prior hip examination:  NA              FEMORAL NECK ANALYSIS RESULTS:        Bone mineral density (BMD) (g/cm2):  0.721      Femoral neck T-Score:  -1.1      % young normals:  85      % age matched controls:  86      Change from prior hip examination:  NA            ADDITIONAL FINDINGS:  No significant additional findings.                     Impression   CONCLUSION:  Bone mineral density values for femoral neck are compatible with the diagnosis of osteopenia by WHO definition (T score between -1.0 and -2.5).  If therapy is initiated, follow-up study is recommended in 2 years for further evaluation of  therapeutic efficacy.             The World Health Organization has defined the following categories based on bone density:  Normal bone:  T-score better than -1  Osteopenia: T-score between -1 and -2.5  Osteoporosis:  T-score less than -2.5

## 2024-01-07 NOTE — H&P
New Patient Evaluation - History and Physical    CONSULT - Reason for Visit:  Decreased BMD.  Requesting Provider: MARCELLO Woods.    CHIEF COMPLAINT:    Chief Complaint   Patient presents with    Consult     Osteopenia, discuss bone scan 12/2023      HISTORY OF PRESENT ILLNESS:   Mine Evans is a 33 year old female who presents with decreased bone mineral density. She started having pain in early July. She then had an x-ray and this showed a non-displaced fracture of the rib, but this was later though not to be a fracture. She subsequently underwent a DEXA scan which showed decreased BMD.    FRACTURE HISTORY  o Low trauma, atraumatic or high trauma (specifics regarding circumstances of fracture)    o Fall-related fracture and circumstances of fall    o Prior history of fracture(s)     o Site, age at fracture, interventions    o Prior history of osteoporosis    o Prior history of osteoporosis treatment (medication, age or date used, duration of use, pre or postmenopausal ,when used, and reason for discontinuation)     o Prolonged history of steroids, aromatase inhibitors, anticonvulsants, and other meds that may affect skeleton    o Chronic use vs. intermittent use with dates of usage    o Secondary conditions associated with osteoporosis    o DXA tests in past ( age or date when performed and results)   PROCEDURE:  XR DEXA BONE DENSITOMETRY (CPT=77080)     COMPARISON:  None.     INDICATIONS:  S22.31XD Closed fracture of one rib of right side with routine healing, subsequent encounter     PATIENT STATED HISTORY: (As transcribed by Technologist)  Closed fracture of right rib          LUMBAR SPINE ANALYSIS RESULTS:      Bone mineral density (BMD) (g/cm2):  0.934    Lumbar T-Score:  -1.0      % young normals:  89      % age matched controls:  90      Change from prior spine examination:  NA              TOTAL HIP ANALYSIS RESULTS:        Bone mineral density (BMD) (g/cm2):  0.871      Total Hip T-Score:   -0.6      % young normals:  92      % age matched controls:  93      Change from prior hip examination:  NA              FEMORAL NECK ANALYSIS RESULTS:        Bone mineral density (BMD) (g/cm2):  0.721      Femoral neck T-Score:  -1.1      % young normals:  85      % age matched controls:  86      Change from prior hip examination:  NA            ADDITIONAL FINDINGS:  No significant additional findings.     Impression   CONCLUSION:  Bone mineral density values for femoral neck are compatible with the diagnosis of osteopenia by WHO definition (T score between -1.0 and -2.5).  If therapy is initiated, follow-up study is recommended in 2 years for further evaluation of  therapeutic efficacy.             The World Health Organization has defined the following categories based on bone density:  Normal bone:  T-score better than -1  Osteopenia: T-score between -1 and -2.5  Osteoporosis:  T-score less than -2.5        LOCATION:  [place]              Dictated by (CST): Sixto Sepulveda MD on 1/02/2024 at 3:14 PM      Finalized by (CST): Sixto Sepulveda MD on 1/02/2024 at 3:15 PM       REPRODUCTIVE HISTORY      o Menarche age   10  o Regular/Irregular menses   Irregular periods and then has been on OCPs since the age of 16 till now.   o History of amenorrhea  Every once in awhile she can go a few months without period  o G P A L  G0  o Breastfeeding  N/A  o Contraceptives? Infertility?  See above  o Natural menopause age   N/A  o Hormone therapy (start and stop)    o Concurrent diseases and medications    PAST MEDICAL HISTORY:   Past Medical History:   Diagnosis Date    ADHD     Anxiety     Asthma     Depression        SURGICAL HISTORY  Past Surgical History:   Procedure Laterality Date    KNEE SURGERY Right      SOCIAL HISTORY    o Occupation     o Country of origin     o Tobacco  None  o Alcohol  occasionally  o Daily calcium intake (food and supplements)  She has just, as well as vitamin D  o Daily exercise  She  used to be before the injury.     FAMILY HISTORY   No family history on file.  Family history of fractures, osteoporosis, osteopenia  Mother was diagnosed with osteoporosis, but now osteopenia    CURRENT MEDICATIONS:    Current Outpatient Medications   Medication Sig Dispense Refill    cyclobenzaprine 5 MG Oral Tab Take 1 tablet (5 mg total) by mouth nightly as needed for Muscle spasms. 30 tablet 2    Meloxicam 15 MG Oral Tab Take meloxicam 15 mg daily with food once a day for 2 weeks.  Then, after 2 weeks, take meloxicam 15 mg with food up to once daily as needed for pain. 30 tablet 2    Lisdexamfetamine Dimesylate 60 MG Oral Cap Take 1 capsule (60 mg total) by mouth every morning.      Zolpidem Tartrate ER 12.5 MG Oral Tab CR Take 1 tablet (12.5 mg total) by mouth nightly as needed. AT BEDTIME      clonazePAM 0.5 MG Oral Tab Take 1 tablet (0.5 mg total) by mouth daily as needed.      FLUoxetine 20 MG Oral Cap Take 2 capsules (40 mg total) by mouth every morning.      LO LOESTRIN FE 1 MG-10 MCG / 10 MCG Oral Tab Take 1 tablet by mouth daily.      albuterol 108 (90 Base) MCG/ACT Inhalation Aero Soln Inhale 2 puffs into the lungs every 6 (six) hours as needed for Wheezing or Shortness of Breath. 1 each 2    Budesonide-Formoterol Fumarate 160-4.5 MCG/ACT Inhalation Aerosol Inhale 2 puffs into the lungs 2 (two) times daily. 1 each 2   Asthma medication- 15 years, has never needed oral steroids for the asthma    ALLERGIES:  No Known Allergies      ASSESSMENTS:   PAIN ASSESSMENT: (Patient reports pain) sore, not in pain    PAIN SCALE: (0-10, please indicate if applicable):      FALL ASSESSMENT: steady    FUNCTIONAL ASSESSMENT (Able to perform all ADLs):  yes    REVIEW OF SYSTEMS:  No falls in past 12 months, no loss of height   Constitutional: Negative for: weight change, fever, fatigue, cold/heat intolerance  Eyes: Negative for:  Visual changes, proptosis, blurring  ENT: Negative for:  dysphagia, neck swelling,  dysphonia  Respiratory: Negative for:  dyspnea, cough  Cardiovascular: Negative for:  chest pain, palpitations, orthopnea  GI: Negative for:  abdominal pain, nausea, vomiting, diarrhea, constipation, bleeding  Neurology: Negative for: headache, numbness, weakness  Genito-Urinary: Negative for: dysuria, frequency  Psychiatric: Negative for:  depression, anxiety  Hematology/Lymphatics: Negative for: bruising, lower extremity edema  Endocrine: Negative for: polyuria, polydypsia  Skin: Negative for: rash, blister, cellulitis,      PHYSICAL EXAM:   Vitals:    01/08/24 0911   BP: 101/62   Pulse: 76   SpO2: 100%   Weight: 149 lb 3.2 oz (67.7 kg)   Height: 5' 5\" (1.651 m)     BMI: Body mass index is 24.83 kg/m².     CONSTITUTIONAL:  awake, alert, cooperative, no apparent distress, and appears stated age  PSYCH: normal affect  EYES:  No proptosis, no ptosis, conjunctiva normal  ENT:  Normocephalic, atraumatic  NECK:  Supple, symmetrical, trachea midline, no adenopathy, thyroid symmetric, not enlarged and no tenderness  HEMATOLOGIC/LYMPHATICS:  no cervical lymphadenopathy and no supraclavicular lymphadenopathy  LUNGS: clear to auscultation bilaterally, no crackles or wheezing  CARDIOVASCULAR:  regular rate and rhythm, normal S1 and S2, no S3 or S4  ABDOMEN:  normal bowel sounds, soft, non-distended, non-tender  SKIN:  no bruising or bleeding, no rashes and no lesions  EXTREMITIES:normal pulses, no edema      DATA:   CMP:    Lab Results   Component Value Date     01/06/2023    K 4.6 01/06/2023     01/06/2023    CO2 28.0 01/06/2023    BUN 10 01/06/2023    GLU 90 01/06/2023    ALB 4.0 01/06/2023    CA 9.3 01/06/2023     FLP (Lipid Profile):    Lab Results   Component Value Date    TRIG 68 01/06/2023    HDL 81 (H) 01/06/2023     LDL direct : No components found for: \"LDLDIRECT\"  Microalbumin/Creatinine ratio:  No components found for: \"RUCREAT\", \"RUMICROAL\", \"MCRATIO\"  TSH:    Lab Results   Component Value Date    TSH  1.600 01/06/2023         ASSESSMENT AND PLAN: This is a 33 year-old woman here for evaluation and management of decreased bone mineral density. I have confirmed from the original report that Z-scores ore similar to the T-scores. Her risk factors could be prolonged use of steroid inhaler (more than 15 years), increased physical activity possibly not matched by nutrient intake, and positive family history.     I would like to complete a full evaluation into other possible contributing factors for this:     I will follow up with her once test results are back.    Prior to this encounter, I spent over 20 minutes with preparing for the visit, reviewing documents from other specialties as well as from PCP, and going over test results and imaging studies. During the face to face encounter, I spent an additional 30 minutes which were determined for history-taking, physical examination, and orientation regarding our services. Greater than 50% of the time was spent in counseling, anticipatory guidance, and coordination of care. Patient concerns were answered to the best of my knowledge.     1/8/2024  Stephanie Cheng MD

## 2024-01-08 ENCOUNTER — OFFICE VISIT (OUTPATIENT)
Dept: ENDOCRINOLOGY CLINIC | Facility: CLINIC | Age: 34
End: 2024-01-08

## 2024-01-08 ENCOUNTER — LAB ENCOUNTER (OUTPATIENT)
Dept: LAB | Age: 34
End: 2024-01-08
Attending: INTERNAL MEDICINE
Payer: COMMERCIAL

## 2024-01-08 VITALS
HEART RATE: 76 BPM | WEIGHT: 149.19 LBS | BODY MASS INDEX: 24.86 KG/M2 | OXYGEN SATURATION: 100 % | SYSTOLIC BLOOD PRESSURE: 101 MMHG | DIASTOLIC BLOOD PRESSURE: 62 MMHG | HEIGHT: 65 IN

## 2024-01-08 DIAGNOSIS — M85.80 OSTEOPENIA, UNSPECIFIED LOCATION: ICD-10-CM

## 2024-01-08 DIAGNOSIS — M85.80 OSTEOPENIA, UNSPECIFIED LOCATION: Primary | ICD-10-CM

## 2024-01-08 LAB
ALBUMIN SERPL-MCNC: 4.1 G/DL (ref 3.2–4.8)
ALBUMIN/GLOB SERPL: 1.5 {RATIO} (ref 1–2)
ALP LIVER SERPL-CCNC: 52 U/L
ALT SERPL-CCNC: <7 U/L
ANION GAP SERPL CALC-SCNC: 5 MMOL/L (ref 0–18)
AST SERPL-CCNC: 10 U/L (ref ?–34)
BASOPHILS # BLD AUTO: 0.11 X10(3) UL (ref 0–0.2)
BASOPHILS NFR BLD AUTO: 1.1 %
BILIRUB SERPL-MCNC: 0.2 MG/DL (ref 0.3–1.2)
BUN BLD-MCNC: 12 MG/DL (ref 9–23)
BUN/CREAT SERPL: 15.6 (ref 10–20)
CALCIUM BLD-MCNC: 8.9 MG/DL (ref 8.7–10.4)
CHLORIDE SERPL-SCNC: 106 MMOL/L (ref 98–112)
CO2 SERPL-SCNC: 27 MMOL/L (ref 21–32)
CORTIS SERPL-MCNC: 10.6 UG/DL
CREAT BLD-MCNC: 0.77 MG/DL
DEPRECATED RDW RBC AUTO: 46 FL (ref 35.1–46.3)
EGFRCR SERPLBLD CKD-EPI 2021: 104 ML/MIN/1.73M2 (ref 60–?)
EOSINOPHIL # BLD AUTO: 0.63 X10(3) UL (ref 0–0.7)
EOSINOPHIL NFR BLD AUTO: 6.6 %
ERYTHROCYTE [DISTWIDTH] IN BLOOD BY AUTOMATED COUNT: 12.7 % (ref 11–15)
FASTING STATUS PATIENT QL REPORTED: YES
GLOBULIN PLAS-MCNC: 2.7 G/DL (ref 2.8–4.4)
GLUCOSE BLD-MCNC: 81 MG/DL (ref 70–99)
HCT VFR BLD AUTO: 37.6 %
HGB BLD-MCNC: 12.6 G/DL
IMM GRANULOCYTES # BLD AUTO: 0.02 X10(3) UL (ref 0–1)
IMM GRANULOCYTES NFR BLD: 0.2 %
LYMPHOCYTES # BLD AUTO: 3.31 X10(3) UL (ref 1–4)
LYMPHOCYTES NFR BLD AUTO: 34.5 %
MCH RBC QN AUTO: 33.2 PG (ref 26–34)
MCHC RBC AUTO-ENTMCNC: 33.5 G/DL (ref 31–37)
MCV RBC AUTO: 99.2 FL
MONOCYTES # BLD AUTO: 0.46 X10(3) UL (ref 0.1–1)
MONOCYTES NFR BLD AUTO: 4.8 %
NEUTROPHILS # BLD AUTO: 5.06 X10 (3) UL (ref 1.5–7.7)
NEUTROPHILS # BLD AUTO: 5.06 X10(3) UL (ref 1.5–7.7)
NEUTROPHILS NFR BLD AUTO: 52.8 %
OSMOLALITY SERPL CALC.SUM OF ELEC: 285 MOSM/KG (ref 275–295)
PHOSPHATE SERPL-MCNC: 3.3 MG/DL (ref 2.4–5.1)
PLATELET # BLD AUTO: 328 10(3)UL (ref 150–450)
POTASSIUM SERPL-SCNC: 3.9 MMOL/L (ref 3.5–5.1)
PROT SERPL-MCNC: 6.8 G/DL (ref 5.7–8.2)
PTH-INTACT SERPL-MCNC: 37.8 PG/ML (ref 18.5–88)
RBC # BLD AUTO: 3.79 X10(6)UL
SODIUM SERPL-SCNC: 138 MMOL/L (ref 136–145)
T4 FREE SERPL-MCNC: 1 NG/DL (ref 0.8–1.7)
TSI SER-ACNC: 4.35 MIU/ML (ref 0.55–4.78)
VIT D+METAB SERPL-MCNC: 22.3 NG/ML (ref 30–100)
WBC # BLD AUTO: 9.6 X10(3) UL (ref 4–11)

## 2024-01-08 PROCEDURE — 82024 ASSAY OF ACTH: CPT

## 2024-01-08 PROCEDURE — 83521 IG LIGHT CHAINS FREE EACH: CPT

## 2024-01-08 PROCEDURE — 85025 COMPLETE CBC W/AUTO DIFF WBC: CPT

## 2024-01-08 PROCEDURE — 82306 VITAMIN D 25 HYDROXY: CPT

## 2024-01-08 PROCEDURE — 84443 ASSAY THYROID STIM HORMONE: CPT

## 2024-01-08 PROCEDURE — 36415 COLL VENOUS BLD VENIPUNCTURE: CPT

## 2024-01-08 PROCEDURE — 84165 PROTEIN E-PHORESIS SERUM: CPT

## 2024-01-08 PROCEDURE — 82533 TOTAL CORTISOL: CPT

## 2024-01-08 PROCEDURE — 83970 ASSAY OF PARATHORMONE: CPT

## 2024-01-08 PROCEDURE — 84439 ASSAY OF FREE THYROXINE: CPT

## 2024-01-08 PROCEDURE — 86334 IMMUNOFIX E-PHORESIS SERUM: CPT

## 2024-01-08 PROCEDURE — 86364 TISS TRNSGLTMNASE EA IG CLAS: CPT

## 2024-01-08 PROCEDURE — 84080 ASSAY ALKALINE PHOSPHATASES: CPT

## 2024-01-08 PROCEDURE — 80053 COMPREHEN METABOLIC PANEL: CPT

## 2024-01-08 PROCEDURE — 84100 ASSAY OF PHOSPHORUS: CPT

## 2024-01-09 LAB
ACTH: 16.1 PG/ML
TTG IGA SER-ACNC: 0.5 U/ML (ref ?–7)
TTG IGG SER-ACNC: <0.6 U/ML (ref ?–7)

## 2024-01-10 LAB
ALBUMIN SERPL ELPH-MCNC: 3.91 G/DL (ref 3.75–5.21)
ALBUMIN/GLOB SERPL: 1.51 {RATIO} (ref 1–2)
ALPHA1 GLOB SERPL ELPH-MCNC: 0.32 G/DL (ref 0.19–0.46)
ALPHA2 GLOB SERPL ELPH-MCNC: 0.68 G/DL (ref 0.48–1.05)
B-GLOBULIN SERPL ELPH-MCNC: 0.75 G/DL (ref 0.68–1.23)
GAMMA GLOB SERPL ELPH-MCNC: 0.85 G/DL (ref 0.62–1.7)
KAPPA LC FREE SER-MCNC: 1.15 MG/DL (ref 0.33–1.94)
KAPPA LC FREE/LAMBDA FREE SER NEPH: 1.11 {RATIO} (ref 0.26–1.65)
LAMBDA LC FREE SERPL-MCNC: 1.03 MG/DL (ref 0.57–2.63)
PROT SERPL-MCNC: 6.5 G/DL (ref 5.7–8.2)

## 2024-01-11 ENCOUNTER — OFFICE VISIT (OUTPATIENT)
Dept: FAMILY MEDICINE CLINIC | Facility: CLINIC | Age: 34
End: 2024-01-11
Payer: COMMERCIAL

## 2024-01-11 VITALS
BODY MASS INDEX: 24.1 KG/M2 | HEIGHT: 65 IN | DIASTOLIC BLOOD PRESSURE: 76 MMHG | WEIGHT: 144.63 LBS | SYSTOLIC BLOOD PRESSURE: 110 MMHG | HEART RATE: 85 BPM | OXYGEN SATURATION: 98 %

## 2024-01-11 DIAGNOSIS — J01.40 ACUTE NON-RECURRENT PANSINUSITIS: Primary | ICD-10-CM

## 2024-01-11 LAB — ALKALINE PHOSPHATASE BONE SPECIFIC: 9.9 UG/L

## 2024-01-11 PROCEDURE — 3008F BODY MASS INDEX DOCD: CPT | Performed by: NURSE PRACTITIONER

## 2024-01-11 PROCEDURE — 3078F DIAST BP <80 MM HG: CPT | Performed by: NURSE PRACTITIONER

## 2024-01-11 PROCEDURE — 3074F SYST BP LT 130 MM HG: CPT | Performed by: NURSE PRACTITIONER

## 2024-01-11 PROCEDURE — 99213 OFFICE O/P EST LOW 20 MIN: CPT | Performed by: NURSE PRACTITIONER

## 2024-01-11 RX ORDER — AMOXICILLIN AND CLAVULANATE POTASSIUM 875; 125 MG/1; MG/1
1 TABLET, FILM COATED ORAL 2 TIMES DAILY
Qty: 20 TABLET | Refills: 0 | Status: SHIPPED | OUTPATIENT
Start: 2024-01-11 | End: 2024-01-21

## 2024-01-11 NOTE — PROGRESS NOTES
Chief Complaint:   Chief Complaint   Patient presents with    Sinus Problem     Pt c/o headaches frontal        HPI:   This is a 33 year old female coming in for sinus pressure and headaches x 1 month. Patient reports congestion with clear mucous, pressure in forehead and cheeks, and frontal headaches. Denies fever/chills, dizziness. No sore throat or cough. Has some post-nasal drip. Not taking any OTC medicines regularly, but she did try Sudafed 1-2 times and that helped.     Results for orders placed or performed in visit on 01/08/24   PTH, Intact   Result Value Ref Range    Pth Intact 37.8 18.5 - 88.0 pg/mL   Comp Metabolic Panel (14)   Result Value Ref Range    Glucose 81 70 - 99 mg/dL    Sodium 138 136 - 145 mmol/L    Potassium 3.9 3.5 - 5.1 mmol/L    Chloride 106 98 - 112 mmol/L    CO2 27.0 21.0 - 32.0 mmol/L    Anion Gap 5 0 - 18 mmol/L    BUN 12 9 - 23 mg/dL    Creatinine 0.77 0.55 - 1.02 mg/dL    BUN/CREA Ratio 15.6 10.0 - 20.0    Calcium, Total 8.9 8.7 - 10.4 mg/dL    Calculated Osmolality 285 275 - 295 mOsm/kg    eGFR-Cr 104 >=60 mL/min/1.73m2    ALT <7 (L) 10 - 49 U/L    AST 10 <=34 U/L    Alkaline Phosphatase 52 37 - 98 U/L    Bilirubin, Total 0.2 (L) 0.3 - 1.2 mg/dL    Total Protein 6.8 5.7 - 8.2 g/dL    Albumin 4.1 3.2 - 4.8 g/dL    Globulin  2.7 (L) 2.8 - 4.4 g/dL    A/G Ratio 1.5 1.0 - 2.0    Patient Fasting for CMP? Yes    Alkaline Phosphatase, Bone Specific   Result Value Ref Range    Alkaline Phosphatase, Bone-Specific 9.9 ug/L   ACTH, Plasma   Result Value Ref Range    ACTH 16.1 7.2 - 63.3 pg/mL   Cortisol   Result Value Ref Range    Cortisol 10.6 ug/dL   TSH and Free T4   Result Value Ref Range    Free T4 1.0 0.8 - 1.7 ng/dL    TSH 4.350 0.550 - 4.780 mIU/mL   Tissue Transglutaminase Ab, IgA   Result Value Ref Range    Tissue Transglutaminase IgA Ab 0.5 <7.0 U/mL   Tissue Transglutaminase AB IgG   Result Value Ref Range    Tissue Transglutaminase IgG Ab <0.6 <7.0 U/mL   Phosphorus   Result  Value Ref Range    Phosphorus 3.3 2.4 - 5.1 mg/dL   Vitamin D, 25-Hydroxy   Result Value Ref Range    Vitamin D, 25OH, Total 22.3 (L) 30.0 - 100.0 ng/mL   SERUM MONOCLONAL PROTEIN STUDY   Result Value Ref Range    Total Protein 6.5 5.7 - 8.2 g/dL    Albumin 3.91 3.75 - 5.21 g/dL    Alpha-1-Globulins 0.32 0.19 - 0.46 g/dL    Alpha-2-Globulins 0.68 0.48 - 1.05 g/dL    Beta Globulins 0.75 0.68 - 1.23 g/dL    Gamma Globulins 0.85 0.62 - 1.70 g/dL    Albumin/Globulin Ratio 1.51 1.00 - 2.00    SPE Interpretation         Serum protein electrophoresis shows no evidence of significant pathologic abnormalities.    Reviewed by Laz Saldivar M.D.        Immunofixation         No evidence of monoclonal proteins identified.    Reviewed by Laz Saldivar M.D.        [place] Free Light Chain 1.145 0.330 - 1.940 mg/dL    Lambda Free Light Chain 1.032 0.571 - 2.630 mg/dL    Kappa/Lambda Flc Ratio 1.11 0.26 - 1.65    Reviewed By:   Laz Saldivar M.D.        CBC W/ DIFFERENTIAL   Result Value Ref Range    WBC 9.6 4.0 - 11.0 x10(3) uL    RBC 3.79 (L) 3.80 - 5.30 x10(6)uL    HGB 12.6 12.0 - 16.0 g/dL    HCT 37.6 35.0 - 48.0 %    MCV 99.2 80.0 - 100.0 fL    MCH 33.2 26.0 - 34.0 pg    MCHC 33.5 31.0 - 37.0 g/dL    RDW-SD 46.0 35.1 - 46.3 fL    RDW 12.7 11.0 - 15.0 %    .0 150.0 - 450.0 10(3)uL    Neutrophil Absolute Prelim 5.06 1.50 - 7.70 x10 (3) uL    Neutrophil Absolute 5.06 1.50 - 7.70 x10(3) uL    Lymphocyte Absolute 3.31 1.00 - 4.00 x10(3) uL    Monocyte Absolute 0.46 0.10 - 1.00 x10(3) uL    Eosinophil Absolute 0.63 0.00 - 0.70 x10(3) uL    Basophil Absolute 0.11 0.00 - 0.20 x10(3) uL    Immature Granulocyte Absolute 0.02 0.00 - 1.00 x10(3) uL    Neutrophil % 52.8 %    Lymphocyte % 34.5 %    Monocyte % 4.8 %    Eosinophil % 6.6 %    Basophil % 1.1 %    Immature Granulocyte % 0.2 %             Past Medical History:   Diagnosis Date    ADHD     Anxiety     Asthma     Depression      Past Surgical History:   Procedure  Laterality Date    KNEE SURGERY Right      Social History:  Social History     Socioeconomic History    Marital status: Single   Tobacco Use    Smoking status: Never    Smokeless tobacco: Never   Vaping Use    Vaping Use: Never used   Substance and Sexual Activity    Alcohol use: Yes     Comment: occ    Drug use: Never     Family History:  History reviewed. No pertinent family history.  Allergies:  No Known Allergies  Current Meds:  Current Outpatient Medications   Medication Sig Dispense Refill    amoxicillin clavulanate 875-125 MG Oral Tab Take 1 tablet by mouth 2 (two) times daily for 10 days. 20 tablet 0    cyclobenzaprine 5 MG Oral Tab Take 1 tablet (5 mg total) by mouth nightly as needed for Muscle spasms. 30 tablet 2    Meloxicam 15 MG Oral Tab Take meloxicam 15 mg daily with food once a day for 2 weeks.  Then, after 2 weeks, take meloxicam 15 mg with food up to once daily as needed for pain. 30 tablet 2    Lisdexamfetamine Dimesylate 60 MG Oral Cap Take 1 capsule (60 mg total) by mouth every morning.      Zolpidem Tartrate ER 12.5 MG Oral Tab CR Take 1 tablet (12.5 mg total) by mouth nightly as needed. AT BEDTIME      clonazePAM 0.5 MG Oral Tab Take 1 tablet (0.5 mg total) by mouth daily as needed.      FLUoxetine 20 MG Oral Cap Take 2 capsules (40 mg total) by mouth every morning.      LO LOESTRIN FE 1 MG-10 MCG / 10 MCG Oral Tab Take 1 tablet by mouth daily.      albuterol 108 (90 Base) MCG/ACT Inhalation Aero Soln Inhale 2 puffs into the lungs every 6 (six) hours as needed for Wheezing or Shortness of Breath. 1 each 2    Budesonide-Formoterol Fumarate 160-4.5 MCG/ACT Inhalation Aerosol Inhale 2 puffs into the lungs 2 (two) times daily. 1 each 2      Counseling given: Not Answered       REVIEW OF SYSTEMS:   CONSTITUTIONAL:  Denies unusual weight gain/loss, fever, chills, or fatigue.  HEENT:  See HPI.  INTEGUMENTARY:  Denies rashes, itching, skin lesion.  CARDIOVASCULAR:  Denies chest pain, palpitations,  edema, dyspnea on exertion or at rest.  RESPIRATORY:  Denies shortness of breath, wheezing, cough or sputum.  GASTROINTESTINAL:  Denies abdominal pain, nausea, vomiting, constipation, diarrhea, or blood in stool.  NEUROLOGICAL:  Denies dizziness. +Headaches as above.    EXAM:   /76 (BP Location: Left arm, Patient Position: Sitting, Cuff Size: adult)   Pulse 85   Ht 5' 5\" (1.651 m)   Wt 144 lb 9.6 oz (65.6 kg)   LMP 12/26/2023 (Approximate)   SpO2 98%   BMI 24.06 kg/m²  Estimated body mass index is 24.06 kg/m² as calculated from the following:    Height as of this encounter: 5' 5\" (1.651 m).    Weight as of this encounter: 144 lb 9.6 oz (65.6 kg).   Vital signs reviewed.Appears stated age, well groomed, in no acute distress.  Physical Exam:  GEN:  Patient is alert, awake and oriented, well developed, well nourished.  HEENT:  Head:  Normocephalic, atraumatic Eyes: EOMI, PERRLA, conjunctivae clear bilaterally, no eye discharge Ears: External normal, TM clear bilaterally. Nose: patent. +Clear nasal discharge. +Frontal and maxillary sinus tenderness to palpation. Throat:  No tonsillar erythema or exudate.  Mouth:  No oral lesions, good dentition.  NECK: Supple, no CLAD, no thyromegaly.  SKIN: No rashes, no skin lesion, no bruising, good turgor.  HEART:  Regular rate and rhythm, no murmurs, rubs or gallops.  LUNGS: Clear to auscultation bilterally, no rales/rhonchi/wheezing.  NEURO:  No deficit, normal gait, strength and tone, sensory intact.    ASSESSMENT AND PLAN:   1. Acute non-recurrent pansinusitis  -Will prescribe Augmentin BID x 10 days, to take with food. Side effects reviewed with patient. Recommended probiotic or yogurt daily while on this medicine. Should finish entire rx even if symptoms improved.  -Recommended symptomatic treatment as well with PO fluids, humidifier, rest. May use Claritin or Zyrtec daily, Flonase 1 spray in each nostril BID, Sudafed PRN OTC.  - amoxicillin clavulanate 875-125 MG  Oral Tab; Take 1 tablet by mouth 2 (two) times daily for 10 days.  Dispense: 20 tablet; Refill: 0      Meds & Refills for this Visit:  Requested Prescriptions     Signed Prescriptions Disp Refills    amoxicillin clavulanate 875-125 MG Oral Tab 20 tablet 0     Sig: Take 1 tablet by mouth 2 (two) times daily for 10 days.         Problem List:  Patient Active Problem List   Diagnosis    Mild persistent asthma without complication    Primary insomnia    Anxiety and depression    Attention deficit hyperactivity disorder (ADHD)    Osteopenia       Monica Blakely, APRN  1/11/2024  8:53 AM

## 2024-01-21 ENCOUNTER — HOSPITAL ENCOUNTER (OUTPATIENT)
Dept: MRI IMAGING | Age: 34
End: 2024-01-21
Attending: Other
Payer: COMMERCIAL

## 2024-01-21 ENCOUNTER — HOSPITAL ENCOUNTER (OUTPATIENT)
Dept: MRI IMAGING | Age: 34
Discharge: HOME | End: 2024-01-21
Attending: Other
Payer: COMMERCIAL

## 2024-01-21 DIAGNOSIS — G43.111 INTRACTABLE MIGRAINE WITH AURA WITH STATUS MIGRAINOSUS: ICD-10-CM

## 2024-01-21 DIAGNOSIS — G43.809 VESTIBULAR MIGRAINE: ICD-10-CM

## 2024-01-21 PROCEDURE — 70553 MRI BRAIN STEM W/O & W/DYE: CPT | Performed by: OTHER

## 2024-01-21 PROCEDURE — 70546 MR ANGIOGRAPH HEAD W/O&W/DYE: CPT | Performed by: OTHER

## 2024-01-21 PROCEDURE — A9575 INJ GADOTERATE MEGLUMI 0.1ML: HCPCS | Performed by: OTHER

## 2024-01-21 RX ORDER — GADOTERATE MEGLUMINE 376.9 MG/ML
15 INJECTION INTRAVENOUS
Status: COMPLETED | OUTPATIENT
Start: 2024-01-21 | End: 2024-01-21

## 2024-01-21 RX ADMIN — GADOTERATE MEGLUMINE 13 ML: 376.9 INJECTION INTRAVENOUS at 10:09:00

## 2024-01-29 ENCOUNTER — LAB ENCOUNTER (OUTPATIENT)
Dept: LAB | Facility: HOSPITAL | Age: 34
End: 2024-01-29
Attending: INTERNAL MEDICINE
Payer: COMMERCIAL

## 2024-01-29 DIAGNOSIS — M85.80 OSTEOPENIA, UNSPECIFIED LOCATION: ICD-10-CM

## 2024-01-29 LAB
CALCIUM 24H UR-SRATE: 198 MG/24HR (ref 100–300)
CREAT UR-SCNC: 1.51 G/24 HR (ref 0.6–1.8)
PHOSPHATE UR-SCNC: 563.2 MG/24HR (ref 400–1300)
SODIUM SERPL-SCNC: 103 MMOL/L/24HR (ref 40–220)
SPECIMEN VOL UR: 1100 ML

## 2024-01-29 PROCEDURE — 82340 ASSAY OF CALCIUM IN URINE: CPT

## 2024-01-29 PROCEDURE — 84105 ASSAY OF URINE PHOSPHORUS: CPT

## 2024-01-29 PROCEDURE — 84300 ASSAY OF URINE SODIUM: CPT

## 2024-01-29 PROCEDURE — 82570 ASSAY OF URINE CREATININE: CPT

## 2024-02-07 ENCOUNTER — OFFICE VISIT (OUTPATIENT)
Dept: ORTHOPEDICS CLINIC | Facility: CLINIC | Age: 34
End: 2024-02-07
Payer: COMMERCIAL

## 2024-02-07 DIAGNOSIS — S23.41XD SPRAIN OF COSTAL CARTILAGE, SUBSEQUENT ENCOUNTER: Primary | ICD-10-CM

## 2024-02-07 PROCEDURE — 99213 OFFICE O/P EST LOW 20 MIN: CPT | Performed by: ORTHOPAEDIC SURGERY

## 2024-02-07 NOTE — PROGRESS NOTES
NURSING INTAKE COMMENTS:   Chief Complaint   Patient presents with    Follow - Up     Right shoulder still hurting. Patient denies any pain at this time. Complains of a little soreness.        HPI: This 33 year old female presents today with complaints of right-sided rib pain follow-up.  She was last here 6 months ago.  She feels significant improvement with respect to her rib pain.  She continues to have intermittent discomfort in the inferior scapular area with certain movements.  She feels she is able to do all activities.  She is interested in returning to swimming.  No mechanical clicking or locking.  No numbness or tingling in the extremity.  No neck pain.    Past Medical History:   Diagnosis Date    ADHD     Anxiety     Asthma     Depression      Past Surgical History:   Procedure Laterality Date    KNEE SURGERY Right      Current Outpatient Medications   Medication Sig Dispense Refill    cyclobenzaprine 5 MG Oral Tab Take 1 tablet (5 mg total) by mouth nightly as needed for Muscle spasms. 30 tablet 2    Meloxicam 15 MG Oral Tab Take meloxicam 15 mg daily with food once a day for 2 weeks.  Then, after 2 weeks, take meloxicam 15 mg with food up to once daily as needed for pain. 30 tablet 2    Lisdexamfetamine Dimesylate 60 MG Oral Cap Take 1 capsule (60 mg total) by mouth every morning.      Zolpidem Tartrate ER 12.5 MG Oral Tab CR Take 1 tablet (12.5 mg total) by mouth nightly as needed. AT BEDTIME      clonazePAM 0.5 MG Oral Tab Take 1 tablet (0.5 mg total) by mouth daily as needed.      FLUoxetine 20 MG Oral Cap Take 2 capsules (40 mg total) by mouth every morning.      LO LOESTRIN FE 1 MG-10 MCG / 10 MCG Oral Tab Take 1 tablet by mouth daily.      albuterol 108 (90 Base) MCG/ACT Inhalation Aero Soln Inhale 2 puffs into the lungs every 6 (six) hours as needed for Wheezing or Shortness of Breath. 1 each 2    Budesonide-Formoterol Fumarate 160-4.5 MCG/ACT Inhalation Aerosol Inhale 2 puffs into the lungs 2  (two) times daily. 1 each 2     No Known Allergies  History reviewed. No pertinent family history.    Social History     Occupational History    Not on file   Tobacco Use    Smoking status: Never    Smokeless tobacco: Never   Vaping Use    Vaping Use: Never used   Substance and Sexual Activity    Alcohol use: Yes     Comment: occ    Drug use: Never    Sexual activity: Not on file        Review of Systems:  GENERAL: denies fevers, chills, night sweats, fatigue, unintentional weight loss/gain  SKIN: denies skin lesions, open sores, rash  HEENT:denies recent vision change, new nasal congestion,hearing loss, tinnitus, sore throat, headaches  RESPIRATORY: denies new shortness of breath, cough, asthma, wheezing  CARDIOVASCULAR: denies chest pain, leg cramps with exertion, palpitations, leg swelling  GI: denies abdominal pain, nausea, vomiting, diarrhea, constipation, hematochezia, worsening heartburn or stomach ulcers  : denies dysuria, hematuria, incontinence, increased frequency, urgency, difficulty urinating  MUSCULOSKELETAL: denies musculoskeletal complaints other than in HPI  NEURO: denies numbness, tingling, weakness, balance issues, dizziness, memory loss  PSYCHIATRIC: denies Hx of depression, anxiety, other psychiatric disorders  HEMATOLOGIC: denies blood clots, anemia, blood clotting disorders, blood transfusion  ENDOCRINE: denies autoimmune disease, thyroid issues, or diabetes  ALLERGY: denies asthma, seasonal allergies    Physical Examination:    [facility] 12/26/2023 (Approximate)   Constitutional: appears well hydrated, alert and responsive, no acute distress noted  Extremities: Right shoulder range of motion is normal in all planes.  Abduction external rotation motor strength 5 out of 5.  No tenderness around the scapula.  No tenderness over the posterior ribs.  Neurological: Unchanged    Imaging:   MRI BRAIN (W+WO) (CPT=70553)    Result Date: 1/21/2024  PROCEDURE:  MRI BRAIN (W+WO) (CPT=70553)  COMPARISON:   None.  INDICATIONS:  G43.809 Vestibular migraine G43.111 Intractable migraine with aura with status migrainosus  TECHNIQUE:  MRI of the brain was performed with multi-planar T1, T2-weighted images with FLAIR sequences and diffusion weighted images without and with infusion.  PATIENT STATED HISTORY:(As transcribed by Technologist)  Vestibular migraines, episode of vertigo last year lasting 6 weeks, unknown cause.   CONTRAST USED:  13 mL of Dotarem  FINDINGS:   The ventricles and sulci are within normal limits.   There is no midline shift or mass effect.   The basal cisterns are patent.   The craniocervical junction is unremarkable.   Midline structures including corpus callosum, optic chiasm and cerebellar tonsils are overall unremarkable.  There is no acute intracranial hemorrhage or extra-axial fluid collection identified.   There is no parenchymal signal abnormality identified.   No significant abnormal parenchymal gradient susceptibility.   There is no abnormal parenchymal or leptomeningeal enhancement.  There is no restricted diffusion to suggest acute ischemia/infarction.  The visualized paranasal sinuses and mastoid air cells are unremarkable.   The expected major intracranial flow voids are present.            CONCLUSION:  Unremarkable MRI brain examination.  No enhancing lesions.  No significant signal abnormalities.   LOCATION:  [facility]    Dictated by (CST): Kyrie Aviles MD on 1/21/2024 at 11:15 AM     Finalized by (CST): Kyrie Aviles MD on 1/21/2024 at 11:16 AM       MRV HEAD (W+WO)(UUX=70120)    Result Date: 1/21/2024  PROCEDURE:  MRV HEAD (W+WO)(CPT=70546)  COMPARISON:  MR [facility]IT[facility], MRI BRAIN (W+WO) (CPT=70553), 1/21/2024, 8:52 AM.  INDICATIONS:  G43.809 Vestibular migraine G43.111 Intractable migraine with aura with status migrainosus  TECHNIQUE:  MR angiography of the brain without infusion was performed using 3D time of flight, multi-planar and 3D reconstructed images.  All measurements  obtained in this exam were performed using NASCET criteria.  PATIENT STATED HISTORY:(As transcribed by Technologist)  Vestibular migraines, episode of vertigo last year lasting 6 weeks, unknown cause.   CONTRAST USED:  13 mL of Dotarem  FINDINGS:  There is no significant evidence of intracranial venous thrombosis. The superior sagittal sinus, inferior sagittal sinus, straight sinus, internal cerebral veins, transverse sinuses, sigmoid sinuses, visualized jugular veins, and numerous cortical veins appear widely patent.            CONCLUSION:  No evidence of dural venous sinus thrombosis.   LOCATION:  [facility]   Dictated by (CST): Kyrie Aviles MD on 1/21/2024 at 10:51 AM     Finalized by (CST): Kyrie Aviles MD on 1/21/2024 at 10:52 AM          Labs:  Lab Results   Component Value Date    WBC 9.6 01/08/2024    HGB 12.6 01/08/2024    .0 01/08/2024      Lab Results   Component Value Date    GLU 81 01/08/2024    BUN 12 01/08/2024    CREATSERUM 0.77 01/08/2024        Assessment and Plan:  Diagnoses and all orders for this visit:    Sprain of costal cartilage, subsequent encounter        Assessment: Right sided oblique strain, rib pain, improved    Plan: I recommend no further treatment.  Resume activities as tolerated.  Follow-up with me again as needed.  We provided a note to return to work without restrictions.    Follow Up: Return if symptoms worsen or fail to improve.    ALISHA LANDRY MD

## 2024-03-07 ENCOUNTER — OFFICE VISIT (OUTPATIENT)
Dept: NEUROLOGY | Facility: CLINIC | Age: 34
End: 2024-03-07
Payer: COMMERCIAL

## 2024-03-07 ENCOUNTER — OFFICE VISIT (OUTPATIENT)
Dept: ENDOCRINOLOGY CLINIC | Facility: CLINIC | Age: 34
End: 2024-03-07

## 2024-03-07 VITALS
WEIGHT: 148 LBS | DIASTOLIC BLOOD PRESSURE: 65 MMHG | HEART RATE: 74 BPM | SYSTOLIC BLOOD PRESSURE: 108 MMHG | BODY MASS INDEX: 24.66 KG/M2 | HEIGHT: 65 IN | OXYGEN SATURATION: 100 %

## 2024-03-07 VITALS — BODY MASS INDEX: 24.66 KG/M2 | HEIGHT: 65 IN | WEIGHT: 148 LBS

## 2024-03-07 DIAGNOSIS — R71.8 ABNORMAL RBC: ICD-10-CM

## 2024-03-07 DIAGNOSIS — R94.6 ABNORMAL THYROID FUNCTION TEST: ICD-10-CM

## 2024-03-07 DIAGNOSIS — G43.809 VESTIBULAR MIGRAINE: ICD-10-CM

## 2024-03-07 DIAGNOSIS — E55.9 VITAMIN D DEFICIENCY: Primary | ICD-10-CM

## 2024-03-07 DIAGNOSIS — G43.101 MIGRAINE WITH AURA AND WITH STATUS MIGRAINOSUS, NOT INTRACTABLE: Primary | ICD-10-CM

## 2024-03-07 DIAGNOSIS — M85.88 OSTEOPENIA OF LUMBAR SPINE: ICD-10-CM

## 2024-03-07 PROCEDURE — 99214 OFFICE O/P EST MOD 30 MIN: CPT | Performed by: INTERNAL MEDICINE

## 2024-03-07 PROCEDURE — 3074F SYST BP LT 130 MM HG: CPT | Performed by: INTERNAL MEDICINE

## 2024-03-07 PROCEDURE — 3078F DIAST BP <80 MM HG: CPT | Performed by: INTERNAL MEDICINE

## 2024-03-07 PROCEDURE — 99214 OFFICE O/P EST MOD 30 MIN: CPT | Performed by: OTHER

## 2024-03-07 PROCEDURE — 3008F BODY MASS INDEX DOCD: CPT | Performed by: INTERNAL MEDICINE

## 2024-03-07 PROCEDURE — 3008F BODY MASS INDEX DOCD: CPT | Performed by: OTHER

## 2024-03-07 RX ORDER — ERGOCALCIFEROL 1.25 MG/1
50000 CAPSULE ORAL WEEKLY
Qty: 12 CAPSULE | Refills: 0 | Status: SHIPPED | OUTPATIENT
Start: 2024-03-07 | End: 2024-06-05

## 2024-03-07 NOTE — PROGRESS NOTES
[facility] [address], [address]  [facility] [address]  [phone]          Established  Follow Up Visit       Date: March 7, 2024  Patient Name: Mine Evans   MRN: [id number]  Primary physician: [address]  [place] [address]    Interval History:   --She had a shoulder/back injury that aggravated occipital headaches that resolved.  She had sinusitis that triggered headaches.  She occasionally gets headaches but overall much improved.      --Gets headaches about 2/weekly.  No dizziness or nausea.  Pressure, resolves with Advil or Excedrin.      OUTPATIENT MEDICATIONS  Current Outpatient Medications on File Prior to Visit   Medication Sig Dispense Refill    cyclobenzaprine 5 MG Oral Tab Take 1 tablet (5 mg total) by mouth nightly as needed for Muscle spasms. 30 tablet 2    Meloxicam 15 MG Oral Tab Take meloxicam 15 mg daily with food once a day for 2 weeks.  Then, after 2 weeks, take meloxicam 15 mg with food up to once daily as needed for pain. 30 tablet 2    Lisdexamfetamine Dimesylate 60 MG Oral Cap Take 1 capsule (60 mg total) by mouth every morning.      Zolpidem Tartrate ER 12.5 MG Oral Tab CR Take 1 tablet (12.5 mg total) by mouth nightly as needed. AT BEDTIME      clonazePAM 0.5 MG Oral Tab Take 1 tablet (0.5 mg total) by mouth daily as needed.      FLUoxetine 20 MG Oral Cap Take 2 capsules (40 mg total) by mouth every morning.      LO LOESTRIN FE 1 MG-10 MCG / 10 MCG Oral Tab Take 1 tablet by mouth daily.      albuterol 108 (90 Base) MCG/ACT Inhalation Aero Soln Inhale 2 puffs into the lungs every 6 (six) hours as needed for Wheezing or Shortness of Breath. 1 each 2    Budesonide-Formoterol Fumarate 160-4.5 MCG/ACT Inhalation Aerosol Inhale 2 puffs into the lungs 2 (two) times daily. 1 each 2     No current facility-administered medications on file prior to visit.         PHYSICAL EXAM:     Ht 65\"   Wt 148 lb (67.1 kg)   LMP 12/26/2023 (Approximate)   BMI  24.63 kg/m²   General appearance: Well appearing, and in no acute distress  Skin: skin color, texture normal.  No rashes or lesions.    Head: Normocephalic, atraumatic.    Neurological exam:    Mental Status:   Attention/Concentration: intact attention on bedside test   Fund of knowledge: intact  Speech: no dysarthria or aphasia     LABS/DATA:  Component      Latest Ref Rng 1/8/2024   WBC      4.0 - 11.0 x10(3) uL 9.6    RBC      3.80 - 5.30 x10(6)uL 3.79 (L)    Hemoglobin      12.0 - 16.0 g/dL 12.6    Hematocrit      35.0 - 48.0 % 37.6    MCV      80.0 - 100.0 fL 99.2    MCH      26.0 - 34.0 pg 33.2    MCHC      31.0 - 37.0 g/dL 33.5    RDW-SD      35.1 - 46.3 fL 46.0    RDW      11.0 - 15.0 % 12.7    Platelet Count      150.0 - 450.0 10(3)uL 328.0    Prelim Neutrophil Abs      1.50 - 7.70 x10 (3) uL 5.06    Neutrophils Absolute      1.50 - 7.70 x10(3) uL 5.06    Lymphocytes Absolute      1.00 - 4.00 x10(3) uL 3.31    Monocytes Absolute      0.10 - 1.00 x10(3) uL 0.46    Eosinophils Absolute      0.00 - 0.70 x10(3) uL 0.63    Basophils Absolute      0.00 - 0.20 x10(3) uL 0.11    Immature Granulocyte Absolute      0.00 - 1.00 x10(3) uL 0.02    Neutrophils %      % 52.8    Lymphocytes %      % 34.5    Monocytes %      % 4.8    Eosinophils %      % 6.6    Basophils %      % 1.1    Immature Granulocyte %      % 0.2    Glucose      70 - 99 mg/dL 81    Sodium      136 - 145 mmol/L 138    Potassium      3.5 - 5.1 mmol/L 3.9    Chloride      98 - 112 mmol/L 106    Carbon Dioxide, Total      21.0 - 32.0 mmol/L 27.0    ANION GAP      0 - 18 mmol/L 5    BUN      9 - 23 mg/dL 12    CREATININE      0.55 - 1.02 mg/dL 0.77    BUN/CREATININE RATIO      10.0 - 20.0  15.6    CALCIUM      8.7 - 10.4 mg/dL 8.9    CALCULATED OSMOLALITY      275 - 295 mOsm/kg 285    EGFR      >=60 mL/min/1.73m2 104    ALT (SGPT)      10 - 49 U/L <7 (L)    AST (SGOT)      <=34 U/L 10    ALKALINE PHOSPHATASE      37 - 98 U/L 52    Total Bilirubin      0.3  - 1.2 mg/dL 0.2 (L)    PROTEIN, TOTAL      5.7 - 8.2 g/dL 6.8    Albumin      3.2 - 4.8 g/dL 4.1    Globulin      2.8 - 4.4 g/dL 2.7 (L)    A/G Ratio      1.0 - 2.0  1.5    Patient Fasting for CMP? Yes       Legend:  (L) Low      IMAGING:  MRI and MRV head  I PERSONALLY REVIEWED THESE IMAGES.   Reviewed MRI images w/ patient     ASSESSMENT:  The patient is a 33 year old woman past medical history of ADHD, depression, asthma who presents for  follow up regarding vestibular migraine.  Her exam was normal.  She has experienced a recurrence of migraines from COVID.    MRI and MRV head are both normal.     Her migraines have been described as a continuous sensation of vertigo with severe headache with photophobia, phonophobia that only improved with laying in a dark quiet room.  She is also had a scotoma and scintillations as visual aura.  No other brainstem symptoms to indicate migraine with brainstem aura.     Her migraines and headaches have overall improved.  She does not wish to try a preventive or prescription medication.       Vestibular migraine  Migraine with aura with status migrainosus  -Preventative: N/A, has had no further migraines since.  Future considerations: Change SSRI to SNRI, Amitriptyline, Topiramate   -Abortive: Can use over-the-counter medications for now. If this does not help, we will prescribe a triptan such as Rizatriptan 10 mg                 Future considerations: Eletriptan, Almotriptan, Ubrelvy, Nurtec                 Limit non-CGRP rescue medications <2 times per week to avoid developing medication overuse headaches   -Status migrainosus: Infusion therapy; oral headache cocktail with NSAID plus Compazine/Benadryl (can also help with vertigo itself); VPA course; Prednisone course; Naratriptan course   -Neuroimaging: Completed    -Ophthalmology evaluation    -Avoid estrogen, progesterone only OCP is fine.  Changing OCP due to stroke risk with estrogen has been discussed multiple times with  patient including today and she has elected to remain on estrogen understanding the risk of stroke with migraine w/ aura.   -Lifestyle information provided    -Patient will let my office know if she becomes pregnant or plan to become pregnant so we can adjust/stop medication safely due to risk to fetus/fetal malformations from medications prescribed      Follow up as needed     Discussed indication, administration, dose, and side effects with patient of any medications personally prescribed. Patient was advised to let my office know if they have any questions or concerns.       Today, I personally spent 15 minutes in this case, including chart review, time spent with patient doing face to face evaluation w/ interview and exam and patient education, counseling, and time was spent in patient education, counseling, and coordination of care as described above.   Issues discussed: Diagnosis and implications on future health, benefits and side effects of present and future medications, test results as well as further testing and medications required.    This note was prepared using Dragon Medical voice recognition dictation software and as a result, errors may occur. When identified, these errors have been corrected. While every attempt is made to correct errors during dictation, discrepancies may still exist    JAKE Wright DO   Staff Physician, Neurology   3/7/2024  1:33 PM

## 2024-03-07 NOTE — PROGRESS NOTES
Follow-up- Reason for Visit:  Decreased BMD.  Requesting Provider: MARCELLO Woods.    CHIEF COMPLAINT:    Chief Complaint   Patient presents with    Follow - Up     Osteopenia, lab results       HISTORY OF PRESENT ILLNESS:   Mine Evans is a 33 year old female who presents with decreased bone mineral density. She started having pain in early July. She then had an x-ray and this showed a non-displaced fracture of the rib, but this was later thought not to be a fracture. She subsequently underwent a DEXA scan which showed decreased BMD. At the last visit, we had decided to evaluate her for all the different deteminants of decreased bone mineral density and she is here to go over the results.     FRACTURE HISTORY  o Low trauma, atraumatic or high trauma (specifics regarding circumstances of fracture)    o Fall-related fracture and circumstances of fall    o Prior history of fracture(s)     o Site, age at fracture, interventions    o Prior history of osteoporosis    o Prior history of osteoporosis treatment (medication, age or date used, duration of use, pre or postmenopausal ,when used, and reason for discontinuation)     o Prolonged history of steroids, aromatase inhibitors, anticonvulsants, and other meds that may affect skeleton    o Chronic use vs. intermittent use with dates of usage    o Secondary conditions associated with osteoporosis    o DXA tests in past ( age or date when performed and results)   PROCEDURE:  XR DEXA BONE DENSITOMETRY (CPT=77080)     COMPARISON:  None.     INDICATIONS:  S22.31XD Closed fracture of one rib of right side with routine healing, subsequent encounter     PATIENT STATED HISTORY: (As transcribed by Technologist)  Closed fracture of right rib          LUMBAR SPINE ANALYSIS RESULTS:      Bone mineral density (BMD) (g/cm2):  0.934    Lumbar T-Score:  -1.0      % young normals:  89      % age matched controls:  90      Change from prior spine examination:  NA               TOTAL HIP ANALYSIS RESULTS:        Bone mineral density (BMD) (g/cm2):  0.871      Total Hip T-Score:  -0.6      % young normals:  92      % age matched controls:  93      Change from prior hip examination:  NA              FEMORAL NECK ANALYSIS RESULTS:        Bone mineral density (BMD) (g/cm2):  0.721      Femoral neck T-Score:  -1.1      % young normals:  85      % age matched controls:  86      Change from prior hip examination:  NA            ADDITIONAL FINDINGS:  No significant additional findings.     Impression   CONCLUSION:  Bone mineral density values for femoral neck are compatible with the diagnosis of osteopenia by WHO definition (T score between -1.0 and -2.5).  If therapy is initiated, follow-up study is recommended in 2 years for further evaluation of  therapeutic efficacy.             The World Health Organization has defined the following categories based on bone density:  Normal bone:  T-score better than -1  Osteopenia: T-score between -1 and -2.5  Osteoporosis:  T-score less than -2.5        LOCATION:  [place]              Dictated by (CST): Sixto Sepulveda MD on 1/02/2024 at 3:14 PM      Finalized by (CST): Sixto Sepulveda MD on 1/02/2024 at 3:15 PM       REPRODUCTIVE HISTORY      o Menarche age   10  o Regular/Irregular menses   Irregular periods and then has been on OCPs since the age of 16 till now.   o History of amenorrhea  Every once in awhile she can go a few months without period  o G P A L  G0  o Breastfeeding  N/A  o Contraceptives? Infertility?  See above  o Natural menopause age   N/A  o Hormone therapy (start and stop)    o Concurrent diseases and medications    PAST MEDICAL HISTORY:   Past Medical History:   Diagnosis Date    ADHD     Anxiety     Asthma (HCC)     Depression        SURGICAL HISTORY  Past Surgical History:   Procedure Laterality Date    KNEE SURGERY Right      SOCIAL HISTORY    o Occupation     o Country of origin     o Tobacco  None  o  Alcohol  occasionally  o Daily calcium intake (food and supplements)  She has just, as well as vitamin D  o Daily exercise  She used to be before the injury.     FAMILY HISTORY   No family history on file.  Family history of fractures, osteoporosis, osteopenia  Mother was diagnosed with osteoporosis, but now osteopenia    CURRENT MEDICATIONS:    Current Outpatient Medications   Medication Sig Dispense Refill    cyclobenzaprine 5 MG Oral Tab Take 1 tablet (5 mg total) by mouth nightly as needed for Muscle spasms. 30 tablet 2    Meloxicam 15 MG Oral Tab Take meloxicam 15 mg daily with food once a day for 2 weeks.  Then, after 2 weeks, take meloxicam 15 mg with food up to once daily as needed for pain. 30 tablet 2    Lisdexamfetamine Dimesylate 60 MG Oral Cap Take 1 capsule (60 mg total) by mouth every morning.      Zolpidem Tartrate ER 12.5 MG Oral Tab CR Take 1 tablet (12.5 mg total) by mouth nightly as needed. AT BEDTIME      clonazePAM 0.5 MG Oral Tab Take 1 tablet (0.5 mg total) by mouth daily as needed.      FLUoxetine 20 MG Oral Cap Take 2 capsules (40 mg total) by mouth every morning.      LO LOESTRIN FE 1 MG-10 MCG / 10 MCG Oral Tab Take 1 tablet by mouth daily.      albuterol 108 (90 Base) MCG/ACT Inhalation Aero Soln Inhale 2 puffs into the lungs every 6 (six) hours as needed for Wheezing or Shortness of Breath. 1 each 2    Budesonide-Formoterol Fumarate 160-4.5 MCG/ACT Inhalation Aerosol Inhale 2 puffs into the lungs 2 (two) times daily. 1 each 2   Asthma medication- 15 years, has never needed oral steroids for the asthma    ALLERGIES:  No Known Allergies      ASSESSMENTS:   PAIN ASSESSMENT: (Patient reports pain) sore, not in pain    PAIN SCALE: (0-10, please indicate if applicable):      FALL ASSESSMENT: steady    FUNCTIONAL ASSESSMENT (Able to perform all ADLs):  yes    REVIEW OF SYSTEMS:  No falls in past 12 months, no loss of height   Constitutional: Negative for: weight change, fever, fatigue,  cold/heat intolerance  Eyes: Negative for:  Visual changes, proptosis, blurring  ENT: Negative for:  dysphagia, neck swelling, dysphonia  Respiratory: Negative for:  dyspnea, cough  Cardiovascular: Negative for:  chest pain, palpitations, orthopnea  GI: Negative for:  abdominal pain, nausea, vomiting, diarrhea, constipation, bleeding  Neurology: Negative for: headache, numbness, weakness  Genito-Urinary: Negative for: dysuria, frequency  Psychiatric: Negative for:  depression, anxiety  Hematology/Lymphatics: Negative for: bruising, lower extremity edema  Endocrine: Negative for: polyuria, polydypsia  Skin: Negative for: rash, blister, cellulitis,      PHYSICAL EXAM:   Vitals:    03/07/24 1039   BP: 108/65   Pulse: 74   SpO2: 100%   Weight: 148 lb (67.1 kg)   Height: 5' 5\" (1.651 m)       BMI: Body mass index is 24.63 kg/m².     CONSTITUTIONAL:  awake, alert, cooperative, no apparent distress, and appears stated age  PSYCH: normal affect  SKIN:  no bruising or bleeding, no rashes and no lesions  EXTREMITIES:normal pulses, no edema      DATA:   CMP:    Lab Results   Component Value Date     01/08/2024    K 3.9 01/08/2024     01/08/2024    CO2 27.0 01/08/2024    BUN 12 01/08/2024    GLU 81 01/08/2024    ALB 4.1 01/08/2024    ALB 3.91 01/08/2024    CA 8.9 01/08/2024     FLP (Lipid Profile):    Lab Results   Component Value Date    TRIG 68 01/06/2023    HDL 81 (H) 01/06/2023     LDL direct : No components found for: \"LDLDIRECT\"  Microalbumin/Creatinine ratio:  No components found for: \"RUCREAT\", \"RUMICROAL\", \"MCRATIO\"  TSH:    Lab Results   Component Value Date    TSH 4.350 01/08/2024         ASSESSMENT AND PLAN: This is a 33 year-old woman here for evaluation and management of decreased bone mineral density. I have confirmed from the original report that Z-scores are similar to the T-scores. Her risk factors could be prolonged use of steroid inhaler (more than 15 years), increased physical activity possibly  not matched by nutrient intake, and positive family history.     We find that she has vitamin D deficiency. I would like to start her on high dose vitamin D and recheck this in 3 months. Her TSH is elevated and I would like to recheck it again in 3 months. The number of RBCs is slightly low and we will check this again in 3 months. I would also like her to start taking calcium citrate one tablet daily.    I have given her information about exercise and improving diet.     Prior to this encounter, I spent over 15 minutes with preparing for the visit, including reviewing documents from other specialties as well as from PCP and going over test results and imaging studies. During the face to face encounter, I spent an additional 15 minutes which were determined for follow-up. Greater than 50% of the time was spent in counseling, anticipatory guidance, and coordination of care. Patient concerns were answered to the best of my knowledge.     3/07/24  Stephanie Cheng MD

## 2024-03-07 NOTE — PATIENT INSTRUCTIONS
HEADACHE / MIGRAINE LIFESTYLE INFORMATION     Headache Preventive Treatment:   Please keep in mind that it takes 4-6 weeks for the medication to start working well and 2-3 months at the appropriate dose before deciding if it will be useful or not. If it is not helping at all by this time, then we will discuss other medications to try. Supplements may take 3-6 months until you see full effect.     Natural supplements:  Magnesium Oxide 400 mg at bedtime   Vitamin B2- 400mg in the morning     Vitamins and herbs that show potential    Magnesium: Magnesium (400 mg at bedtime) has a relaxant effect on smooth muscles such as blood vessels. Individuals suffering from frequent or daily headache usually have low magnesium levels which can be increase with daily supplementation of 400-750 mg. Three trials found 40-90% average headache reduction  when used as a preventative. Magnesium also demonstrated the benefit in menstrually related migraine.  Magnesium is part of the messenger system in the serotonin cascade and it is a good muscle relaxant.  It is also useful for constipation which can be a side effect of other medications used to treat migraine. Good sources include nuts, whole grains, and tomatoes.   Magnesium oxide - loose stool sometimes   Magnesium glycinate - less GI side effects     Riboflavin (vitamin B 2) 400 mg in the morning. This vitamin assists nerve cells in the production of ATP a principal energy storing molecule.  It is necessary for many chemical reactions in the body.  There have been at least 3 clinical trials of riboflavin using 400 mg per day all of which suggested that migraine frequency can be decreased.  All 3 trials showed significant improvement in over half of migraine sufferers.  The supplement is found in bread, cereal, milk, meat, and poultry.  Most Americans get more riboflavin than the recommended daily allowance, however riboflavin deficiency is not necessary for the supplements to help  prevent headache.    Melatonin: Increasing evidence shows correlation between melatonin secretion and headache conditions.  Melatonin supplementation has decreased headache intensity and duration.  It is widely used as a sleep aid.  Sleep is natures way of dealing with migraine.  A dose of 3 mg is recommended to start for headaches including cluster headache. Higher doses up to 15 mg has been reviewed for use in Cluster headache and have been used.  The rationale behind using melatonin for cluster is that many theories regarding the cause of Cluster headache center around the disruption of the normal circadian rhythm in the brain.  This helps restore the normal circadian rhythm.    Evelyne: Evelyne has a small amount of antihistamine and anti-inflammatory action which may help headache.  It is primarily used for nausea and may aid in the absorption of other medications.    HEADACHE DIET: Foods and beverages which may trigger migraine  Note that only 20% of headache patients are food sensitive. You will know if you are food sensitive if you get a headache consistently 20 minutes to 2 hours after eating a certain food. Only cut out a food if it causes headaches, otherwise you might remove foods you enjoy! What matters most for diet is to eat a well balanced healthy diet full of vegetables and low fat protein, and to not miss meals.    Chocolate, other sweets    ALL cheeses except cottage and cream cheese    Dairy products, yogurt, sour cream, ice cream    Liver    Meat extracts (Bovril, Marmite, meat tenderizers)    Meats or fish which have undergone aging, fermenting, pickling or smoking. These include: Hotdogs,salami,Lox,sausage,  mortadellas,smoked salmon, pepperoni, Pickled herring    Pods of broad bean (English beans, Chinese pea pods, Italian (tai) beans, lima and navy beans    Ripe avocado, ripe banana    Yeast extracts or active yeast preparations such as Myles's or Otoniel's (commercial bakes goods are  permitted)    Tomato based foods, pizza (lasagna, etc.)    MSG (monosodium glutamate) is disguised as many things; look for these common aliases:  Monopotassium glutamate  Autolysed yeast  Hydrolysed protein  Sodium caseinate  “flavorings”  “all natural preservatives\"    Nutrasweet    Avoid all other foods that convincingly provoke headaches.    Headache Prevention Strategies:    1. Maintain a headache diary; learn to identify and avoid triggers. Common triggers include:    Emotional triggers:  Emotional/Upset family or friends  Emotional/Upset occupation  Business reversal/success  Anticipation anxiety  Crisis-serious  Post-crisis periodNew job/position     Physical triggers:  Vacation Day  Weekend  Strenuous Exercise  High Altitude Location  New Move  Menstrual Day  Physical Illness  Oversleep/Not enough sleep  Weather changes  Light: Photophobia or light sesnitivity treatment involves a balance between desensitization and reduction in overly strong input. Use dark polarized glasses outside, but not inside. Avoid bright or fluorescent light, but do not dim environment to the point that going into a normally lit room hurts. Consider FL-41 tint lenses, which reduce the most irritating wavelengths without blocking too much light.  These can be obtained at [employer] or [employer]  Foods: see list above.    2. Limit use of acute treatments (over-the-counter medications, triptans, etc.) to no more than 2 days per week or 10 days per month to prevent medication overuse headache (rebound headache).      3. Follow a regular schedule (including weekends and holidays):  Don't skip meals. Eat a balanced diet.  8 hours of sleep nightly.  Minimize stress.  Exercise 30 minutes per day. Being overweight is associated with a 5 times increased risk of chronic migraine.  Keep well hydrated and drink 6-8 glasses of water per day.    4. Initiate non-pharmacologic measures at the earliest onset of your headache.  Rest and  quiet environment.  Relax and reduce stress. [employer] is a free nataly that can instruct you on    some simple relaxtion and breathing techniques. Http://[employer] is a    free website that provides teaching videos on relaxation.  Also, there are  many apps that   can be downloaded for “mindful” relaxation.  An nataly called YOGA NIDRA will help walk you through mindfulness.  Cold compresses.    5. Don't wait!! Take the maximum allowable dosage of prescribed medication at the first sign of migraine.    6. Compliance:  Take prescribed medication regularly as directed and at the first sign of a migraine.    7. Communicate:  Call your physician when problems arise, especially if your headaches change, increase in frequency/severity, or become associated with neurological symptoms (weakness, numbness, slurred speech, etc.).    8. Headache/pain management therapies: Consider various complementary methods, including medication, behavioral therapy, psychological counselling, biofeedback, massage therapy, acupuncture, dry needling, and other modalities.  Such measures may reduce the need for medications. Counseling for pain management, where patients learn to function and ignore/minimize their pain, seems to work very well.    9. Recommend changing family's attention and focus away from patient's headaches. Instead, emphasize daily activities. If first question of day is 'How are your headaches/Do you have a headache today?', then patient will constantly think about headaches, thus making them worse. Goal is to re-direct attention away from headaches, toward daily activities and other distractions.    10. Helpful Websites:  www.AmericanHeadacheSociety.org  www.migrainetrust.org  www.headaches.org  www.migraine.org.uk  www.achenet.org    11. HEADACHE EXPECTATIONS:  There are many types of headaches, and only a rare few in which complete relief can be expected.  In general, there is no cure for headache, especially migraine  based headaches.  There is nothing available that completely prevents headaches from occurring, breaking through, or having periodic flare-ups and fluctuations.  Regardless of what you are using on a daily basis for prevention, episodic headaches should still be expected, and periods where frequency may escalate and fluctuate are unavoidable.     There is no quick fix for most headaches.  Furthermore, the longer you have had high frequency headaches (such as chronic daily headache), the longer it will likely take to expect any improvement.  In fact, some people will never improve, regardless of how many medications or other treatments we try.  Our treatment strategy is to evaluate for possible causes of your headache, although testing is usually always normal, even in cases of daily continuous headaches for years.  Most types of headache such as migraine are electrical brain disorders (similar to how epilepsy is an electrical brain disorders).  Therefore, there is no testing that will reveal this \"dysfunctional electrical circuitry\" such on MRI, or other testing.  We try to find a medication that may help lessen the frequency and/or severity of your headaches.  The goal is not to completely stop them from happening, although if that happens, great!  Different people respond to different medications, and some people just don't respond to anything, so it's usually a matter of trying different options.  We can not predict if or when exactly you will respond to a treatment that we provide.     Preventive headache medications take 4-6 weeks to start working, and 2-3 months to see full effect, assuming you reach an effective dose.  Therefore, calling or messaging frequently because you have a headache flare prior to the 3 month bertha is unlikely to change anything, and unfortunately there is nothing available that will expedite this, so please try to avoid this.  Our recommendation will generally be to give it adequate  time first.  If you are unable to wait it out for medications to work, we can also try IV infusions for some temporary relief.       In general, the best that preventive medications or other treatments (including Botox) are able to offer in migraine management (variable in other headache types) is a 50% improvement in frequency and/or severity of headache.  That is our goal, and any additional benefit is considered a bonus.  Some people do significantly better than this, others do not get close to this.  Therefore, if your headaches are not improving by at least 3 months on your preventive strategy, contact us and we can discuss further adjustments.  Keep in mind that complete headache cure is not a realistic expectation.    Our Team:  The nursing staff, and medical assistants are a major part of YOUR TREATMENT TEAM and will be handling your phone calls and inquiries, if any. Unless explicitly told otherwise at the time of your office visit, your study results and ensuing treatment plans will be released via Down and discussed during your follow-up appointment.     [facility]: Please ask the schedulers to give you an activation code. The main way of communication is by [employer] rather than phone lines, so if you have not signed up, please do so. [employer] is also the way that you can review your labs and testing.  We are not able to contact everyone to tell them results are normal.  If you do not hear back from us regarding testing you have had, it should be considered normal or within normal range.  If you have any questions about the results, you are free to message us.     Down is meant for simple questions regarding medications, possible side effects, or other simple straight forward questions in limited sentences, rather than multiple paragraphs of discussion.  Down is not meant for, or efficient for these complex questions, extensive questions, extensive medication adjustments, complex new symptoms or  concerns.  These issues beyond simple questions require a follow up visit with myself,      Refills:  Please pay attention to when your refills will need to be renewed. Due to the volume of phone calls daily, this could potentially take a few days, although we certainly try to honor your refill requests as soon as we can.  You should call at least 1 week in advance of needing a refill to ensure you do not run out of medication.  Keep in mind that refill requests on Fridays may not be filled until the following week.

## 2024-03-11 PROBLEM — R94.6 ABNORMAL THYROID FUNCTION TEST: Status: ACTIVE | Noted: 2024-03-11

## 2024-03-11 PROBLEM — E55.9 VITAMIN D DEFICIENCY: Status: ACTIVE | Noted: 2024-03-11

## 2024-03-11 PROBLEM — R71.8 ABNORMAL RBC: Status: ACTIVE | Noted: 2024-03-11

## 2024-07-01 ENCOUNTER — OFFICE VISIT (OUTPATIENT)
Dept: FAMILY MEDICINE CLINIC | Facility: CLINIC | Age: 34
End: 2024-07-01
Payer: COMMERCIAL

## 2024-07-01 VITALS
HEART RATE: 94 BPM | OXYGEN SATURATION: 96 % | DIASTOLIC BLOOD PRESSURE: 78 MMHG | BODY MASS INDEX: 26.69 KG/M2 | TEMPERATURE: 97 F | WEIGHT: 160.19 LBS | SYSTOLIC BLOOD PRESSURE: 110 MMHG | HEIGHT: 65 IN

## 2024-07-01 DIAGNOSIS — J06.9 VIRAL URI: Primary | ICD-10-CM

## 2024-07-01 DIAGNOSIS — R09.81 NASAL CONGESTION: ICD-10-CM

## 2024-07-01 DIAGNOSIS — J01.40 ACUTE NON-RECURRENT PANSINUSITIS: ICD-10-CM

## 2024-07-01 PROBLEM — F41.9 ANXIETY: Status: ACTIVE | Noted: 2023-08-10

## 2024-07-01 PROBLEM — J45.909 ASTHMA (HCC): Status: ACTIVE | Noted: 2023-08-10

## 2024-07-01 PROCEDURE — 3008F BODY MASS INDEX DOCD: CPT | Performed by: NURSE PRACTITIONER

## 2024-07-01 PROCEDURE — 3074F SYST BP LT 130 MM HG: CPT | Performed by: NURSE PRACTITIONER

## 2024-07-01 PROCEDURE — 99213 OFFICE O/P EST LOW 20 MIN: CPT | Performed by: NURSE PRACTITIONER

## 2024-07-01 PROCEDURE — 3078F DIAST BP <80 MM HG: CPT | Performed by: NURSE PRACTITIONER

## 2024-07-01 RX ORDER — AMOXICILLIN AND CLAVULANATE POTASSIUM 875; 125 MG/1; MG/1
1 TABLET, FILM COATED ORAL 2 TIMES DAILY
Qty: 20 TABLET | Refills: 0 | Status: SHIPPED | OUTPATIENT
Start: 2024-07-01 | End: 2024-07-11

## 2024-07-01 NOTE — PATIENT INSTRUCTIONS
-Claritin or Zyrtec 1 tablet daily OTC x 2 weeks  -Flonase nasal spray 1 spray in each nostril twice daily x 2 weeks. Think \"nose to toes\" and rinse mouth with water after use.

## 2024-07-01 NOTE — PROGRESS NOTES
Chief Complaint:   Chief Complaint   Patient presents with    Throat Problem     Itching, sore throat for about 3 weeks & headaches.        HPI:   This is a 33 year old female coming in for sore, scratchy throat off/on x 3 weeks. Also reports some congestion, facial pressure, headaches. Started feeling better on Friday when she used Sudafed and Mucinex. Had not tried medication prior to that. Also wondering if she is allergic to anything in the environment.    Results for orders placed or performed in visit on 01/29/24   Calcium, 24Hr Urine   Result Value Ref Range    Calcium Urine Calc 198 100 - 300 mg/24hr    Urine Volume 24Hr 1,100 mL   Creatinine, 24-Hour Urine   Result Value Ref Range    Creat Ur Calc 1.51 0.60 - 1.80 g/24 hr    Urine Volume 24Hr 1,100 mL   Urine Phosphorus 24 HR   Result Value Ref Range    Phos Ur Calc 563.2 400 - 1,300 mg/24hr    Urine Volume 24Hr 1,100 mL   Sodium, Urine, 24-Hour   Result Value Ref Range    Na Ur Calc 103 40 - 220 mmol/L/24hr    Urine Volume 24Hr 1,100 mL             Past Medical History:    ADHD    Anxiety    Asthma (HCC)    Depression     Past Surgical History:   Procedure Laterality Date    Knee surgery Right      Social History:  Social History     Socioeconomic History    Marital status: Single   Tobacco Use    Smoking status: Never    Smokeless tobacco: Never   Vaping Use    Vaping status: Never Used   Substance and Sexual Activity    Alcohol use: Yes     Comment: occ    Drug use: Never   Other Topics Concern    Caffeine Concern No    Exercise No    Seat Belt No    Special Diet No    Stress Concern No    Weight Concern No     Family History:  Family History   Problem Relation Age of Onset    Prostate Cancer Father     Depression Mother      Allergies:  No Known Allergies  Current Meds:  Current Outpatient Medications   Medication Sig Dispense Refill    amoxicillin clavulanate 875-125 MG Oral Tab Take 1 tablet by mouth 2 (two) times daily for 10 days. 20 tablet 0     Lisdexamfetamine Dimesylate 60 MG Oral Cap Take 1 capsule (60 mg total) by mouth every morning.      Zolpidem Tartrate ER 12.5 MG Oral Tab CR Take 1 tablet (12.5 mg total) by mouth nightly as needed. AT BEDTIME      clonazePAM 0.5 MG Oral Tab Take 1 tablet (0.5 mg total) by mouth daily as needed.      FLUoxetine 20 MG Oral Cap Take 2 capsules (40 mg total) by mouth every morning.      LO LOESTRIN FE 1 MG-10 MCG / 10 MCG Oral Tab Take 1 tablet by mouth daily.      albuterol 108 (90 Base) MCG/ACT Inhalation Aero Soln Inhale 2 puffs into the lungs every 6 (six) hours as needed for Wheezing or Shortness of Breath. 1 each 2    Budesonide-Formoterol Fumarate 160-4.5 MCG/ACT Inhalation Aerosol Inhale 2 puffs into the lungs 2 (two) times daily. 1 each 2      Counseling given: Not Answered       REVIEW OF SYSTEMS:   CONSTITUTIONAL:  Denies unusual weight gain/loss, fever, chills, or fatigue.  HEENT:  See HPI.  INTEGUMENTARY:  Denies rashes, itching, skin lesion.  CARDIOVASCULAR:  Denies chest pain, palpitations, edema, dyspnea on exertion or at rest.  RESPIRATORY:  Denies shortness of breath, wheezing, cough or sputum.  GASTROINTESTINAL:  Denies abdominal pain, nausea, vomiting, constipation, diarrhea, or blood in stool.  NEUROLOGICAL:  Denies seizures, dizziness. +Sinus headaches.    EXAM:   /78 (BP Location: Left arm, Patient Position: Sitting, Cuff Size: adult)   Pulse 94   Temp 97.1 °F (36.2 °C) (Temporal)   Ht 5' 5\" (1.651 m)   Wt 160 lb 3.2 oz (72.7 kg)   LMP 06/14/2024 (Approximate)   SpO2 96%   BMI 26.66 kg/m²  Estimated body mass index is 26.66 kg/m² as calculated from the following:    Height as of this encounter: 5' 5\" (1.651 m).    Weight as of this encounter: 160 lb 3.2 oz (72.7 kg).   Vital signs reviewed.Appears stated age, well groomed, in no acute distress.  Physical Exam:  GEN:  Patient is alert, awake and oriented, well developed, well nourished.  HEENT:  Head:  Normocephalic, atraumatic Eyes:  EOMI, PERRLA, conjunctivae clear bilaterally, no eye discharge Ears: External normal, TM clear bilaterally. Nose: patent, no nasal discharge. Sinus tenderness to palpation over frontal and maxillary sinuses. Throat:  No tonsillar erythema or exudate.  Mouth:  No oral lesions, good dentition.  NECK: Supple, no CLAD, no thyromegaly.  SKIN: No rashes, no skin lesion, no bruising, good turgor.  HEART:  Regular rate and rhythm, no murmurs, rubs or gallops.  LUNGS: Clear to auscultation bilterally, no rales/rhonchi/wheezing.  NEURO:  No deficit, normal gait, strength and tone, sensory intact.    ASSESSMENT AND PLAN:   1. Viral URI  -Symptoms likely viral vs allergic. Recommended daily Claritin or Zyrtec OTC x 2 weeks. Recommended Flonase BID, instructed on how to use and possible side effects. Push PO fluids, humidifier, rest. May continue Sudafed/Mucinex PRN OTC.  -If no improvement can treat as sinusitis, see below.    2. Acute non-recurrent pansinusitis  -If she starts this antibiotic should finish entire rx. Pharmacy will hold and not fill unless she calls, recommended giving medicines above at least 3-5 days to work prior to starting. Side effects of this medicine reviewed.   - amoxicillin clavulanate 875-125 MG Oral Tab; Take 1 tablet by mouth 2 (two) times daily for 10 days.  Dispense: 20 tablet; Refill: 0    3. Nasal congestion  -Will check for common allergies on blood test.   - Allergens, Zone 8 [E]; Future      Meds & Refills for this Visit:  Requested Prescriptions     Signed Prescriptions Disp Refills    amoxicillin clavulanate 875-125 MG Oral Tab 20 tablet 0     Sig: Take 1 tablet by mouth 2 (two) times daily for 10 days.         Problem List:  Patient Active Problem List   Diagnosis    Mild persistent asthma without complication (HCC)    Primary insomnia    Anxiety and depression    Attention deficit hyperactivity disorder (ADHD)    Osteopenia    Vitamin D deficiency    Abnormal thyroid function test     Abnormal RBC    Anxiety    Asthma (HCC)       Monica Blakely, APRN  7/1/2024  4:55 PM

## 2024-08-09 ENCOUNTER — LAB ENCOUNTER (OUTPATIENT)
Dept: LAB | Age: 34
End: 2024-08-09
Attending: NURSE PRACTITIONER
Payer: COMMERCIAL

## 2024-08-09 DIAGNOSIS — R09.81 NASAL CONGESTION: ICD-10-CM

## 2024-08-09 DIAGNOSIS — E55.9 VITAMIN D DEFICIENCY: ICD-10-CM

## 2024-08-09 DIAGNOSIS — R94.6 ABNORMAL THYROID FUNCTION TEST: ICD-10-CM

## 2024-08-09 DIAGNOSIS — R71.8 ABNORMAL RBC: ICD-10-CM

## 2024-08-09 LAB
BASOPHILS # BLD AUTO: 0.12 X10(3) UL (ref 0–0.2)
BASOPHILS NFR BLD AUTO: 1.7 %
DEPRECATED RDW RBC AUTO: 47.2 FL (ref 35.1–46.3)
EOSINOPHIL # BLD AUTO: 0.29 X10(3) UL (ref 0–0.7)
EOSINOPHIL NFR BLD AUTO: 4.1 %
ERYTHROCYTE [DISTWIDTH] IN BLOOD BY AUTOMATED COUNT: 13 % (ref 11–15)
HCT VFR BLD AUTO: 38.3 %
HGB BLD-MCNC: 12.8 G/DL
IMM GRANULOCYTES # BLD AUTO: 0.01 X10(3) UL (ref 0–1)
IMM GRANULOCYTES NFR BLD: 0.1 %
LYMPHOCYTES # BLD AUTO: 2.89 X10(3) UL (ref 1–4)
LYMPHOCYTES NFR BLD AUTO: 41.2 %
MCH RBC QN AUTO: 33.2 PG (ref 26–34)
MCHC RBC AUTO-ENTMCNC: 33.4 G/DL (ref 31–37)
MCV RBC AUTO: 99.5 FL
MONOCYTES # BLD AUTO: 0.47 X10(3) UL (ref 0.1–1)
MONOCYTES NFR BLD AUTO: 6.7 %
NEUTROPHILS # BLD AUTO: 3.24 X10 (3) UL (ref 1.5–7.7)
NEUTROPHILS # BLD AUTO: 3.24 X10(3) UL (ref 1.5–7.7)
NEUTROPHILS NFR BLD AUTO: 46.2 %
PLATELET # BLD AUTO: 398 10(3)UL (ref 150–450)
RBC # BLD AUTO: 3.85 X10(6)UL
T4 FREE SERPL-MCNC: 1.1 NG/DL (ref 0.8–1.7)
TSI SER-ACNC: 1.49 MIU/ML (ref 0.55–4.78)
VIT D+METAB SERPL-MCNC: 28.7 NG/ML (ref 30–100)
WBC # BLD AUTO: 7 X10(3) UL (ref 4–11)

## 2024-08-09 PROCEDURE — 82785 ASSAY OF IGE: CPT | Performed by: NURSE PRACTITIONER

## 2024-08-09 PROCEDURE — 86003 ALLG SPEC IGE CRUDE XTRC EA: CPT | Performed by: NURSE PRACTITIONER

## 2024-08-13 LAB
A ALTERNATA IGE QN: 0.37 KUA/L (ref ?–0.1)
A FUMIGATUS IGE QN: <0.1 KUA/L (ref ?–0.1)
AMER SYCAMORE IGE QN: <0.1 KUA/L (ref ?–0.1)
BERMUDA GRASS IGE QN: 0.2 KUA/L (ref ?–0.1)
BOXELDER IGE QN: <0.1 KUA/L (ref ?–0.1)
C HERBARUM IGE QN: <0.1 KUA/L (ref ?–0.1)
CALIF WALNUT IGE QN: <0.1 KUA/L (ref ?–0.1)
CAT DANDER IGE QN: 1.26 KUA/L (ref ?–0.1)
CMN PIGWEED IGE QN: <0.1 KUA/L (ref ?–0.1)
COMMON RAGWEED IGE QN: 0.15 KUA/L (ref ?–0.1)
COTTONWOOD IGE QN: <0.1 KUA/L (ref ?–0.1)
D FARINAE IGE QN: 0.26 KUA/L (ref ?–0.1)
D PTERONYSS IGE QN: 0.24 KUA/L (ref ?–0.1)
DOG DANDER IGE QN: 0.28 KUA/L (ref ?–0.1)
IGE SERPL-ACNC: 29.1 KU/L (ref 2–214)
M RACEMOSUS IGE QN: <0.1 KUA/L (ref ?–0.1)
MARSH ELDER IGE QN: <0.1 KUA/L (ref ?–0.1)
MOUSE EPITH IGE QN: <0.1 KUA/L (ref ?–0.1)
MT JUNIPER IGE QN: <0.1 KUA/L (ref ?–0.1)
P NOTATUM IGE QN: <0.1 KUA/L (ref ?–0.1)
PECAN/HICK TREE IGE QN: <0.1 KUA/L (ref ?–0.1)
ROACH IGE QN: <0.1 KUA/L (ref ?–0.1)
SALTWORT IGE QN: <0.1 KUA/L (ref ?–0.1)
SILVER BIRCH IGE QN: <0.1 KUA/L (ref ?–0.1)
TIMOTHY IGE QN: 1.12 KUA/L (ref ?–0.1)
WHITE ASH IGE QN: 0.14 KUA/L (ref ?–0.1)
WHITE ELM IGE QN: <0.1 KUA/L (ref ?–0.1)
WHITE MULBERRY IGE QN: <0.1 KUA/L (ref ?–0.1)
WHITE OAK IGE QN: <0.1 KUA/L (ref ?–0.1)

## 2024-09-11 RX ORDER — MELOXICAM 15 MG/1
1 TABLET ORAL DAILY
COMMUNITY

## 2024-09-12 ENCOUNTER — OFFICE VISIT (OUTPATIENT)
Dept: FAMILY MEDICINE CLINIC | Facility: CLINIC | Age: 34
End: 2024-09-12
Payer: COMMERCIAL

## 2024-09-12 VITALS
TEMPERATURE: 98 F | SYSTOLIC BLOOD PRESSURE: 112 MMHG | WEIGHT: 174 LBS | BODY MASS INDEX: 29 KG/M2 | DIASTOLIC BLOOD PRESSURE: 64 MMHG | HEART RATE: 100 BPM

## 2024-09-12 DIAGNOSIS — J45.30 MILD PERSISTENT ASTHMA WITHOUT COMPLICATION (HCC): ICD-10-CM

## 2024-09-12 DIAGNOSIS — Z00.00 ADULT GENERAL MEDICAL EXAMINATION: Primary | ICD-10-CM

## 2024-09-12 DIAGNOSIS — J30.2 SEASONAL ALLERGIES: ICD-10-CM

## 2024-09-12 DIAGNOSIS — Z23 ENCOUNTER FOR IMMUNIZATION: ICD-10-CM

## 2024-09-12 PROBLEM — F41.9 ANXIETY: Status: RESOLVED | Noted: 2023-08-10 | Resolved: 2024-09-12

## 2024-09-12 PROBLEM — J45.909 ASTHMA (HCC): Status: RESOLVED | Noted: 2023-08-10 | Resolved: 2024-09-12

## 2024-09-12 RX ORDER — ALBUTEROL SULFATE 90 UG/1
2 AEROSOL, METERED RESPIRATORY (INHALATION) EVERY 6 HOURS PRN
Qty: 1 EACH | Refills: 2 | Status: SHIPPED | OUTPATIENT
Start: 2024-09-12

## 2024-09-12 RX ORDER — BUDESONIDE AND FORMOTEROL FUMARATE DIHYDRATE 160; 4.5 UG/1; UG/1
2 AEROSOL RESPIRATORY (INHALATION) 2 TIMES DAILY
Qty: 1 EACH | Refills: 2 | Status: SHIPPED | OUTPATIENT
Start: 2024-09-12

## 2024-09-12 NOTE — PROGRESS NOTES
Chief Complaint:   Chief Complaint   Patient presents with    Follow - Up     Asthma needs med refills    Allergies     Allergic to grass and cats, this summer asthma is harder to breathe       HPI:   This is a 33 year old female coming in for physical and asthma follow-up. Uses albuterol inhaler before running only. Denies nighttime awakenings for asthma, no hospitalizations. Uses Symbicort as directed BID. Seeing psychiatry and endocrinology.  Diagnosed with allergies to cats and grass this summer, using Claritin and Flonase regularly with improvement in symptoms.     Results for orders placed or performed in visit on 08/09/24   TSH and Free T4   Result Value Ref Range    Free T4 1.1 0.8 - 1.7 ng/dL    TSH 1.492 0.550 - 4.780 mIU/mL   CBC With Differential With Platelet   Result Value Ref Range    WBC 7.0 4.0 - 11.0 x10(3) uL    RBC 3.85 3.80 - 5.30 x10(6)uL    HGB 12.8 12.0 - 16.0 g/dL    HCT 38.3 35.0 - 48.0 %    MCV 99.5 80.0 - 100.0 fL    MCH 33.2 26.0 - 34.0 pg    MCHC 33.4 31.0 - 37.0 g/dL    RDW-SD 47.2 (H) 35.1 - 46.3 fL    RDW 13.0 11.0 - 15.0 %    .0 150.0 - 450.0 10(3)uL    Neutrophil Absolute Prelim 3.24 1.50 - 7.70 x10 (3) uL    Neutrophil Absolute 3.24 1.50 - 7.70 x10(3) uL    Lymphocyte Absolute 2.89 1.00 - 4.00 x10(3) uL    Monocyte Absolute 0.47 0.10 - 1.00 x10(3) uL    Eosinophil Absolute 0.29 0.00 - 0.70 x10(3) uL    Basophil Absolute 0.12 0.00 - 0.20 x10(3) uL    Immature Granulocyte Absolute 0.01 0.00 - 1.00 x10(3) uL    Neutrophil % 46.2 %    Lymphocyte % 41.2 %    Monocyte % 6.7 %    Eosinophil % 4.1 %    Basophil % 1.7 %    Immature Granulocyte % 0.1 %   Allergens, Zone 8 [E]   Result Value Ref Range    Allergen A. Alternata 0.37 (H) <0.10 kUA/L    Allergen Aspergillus Fumigatus <0.10 <0.10 kUA/L    Allergen Bermuda Grass 0.20 (H) <0.10 kUA/L    Allergen [place] <0.10 <0.10 kUA/L    Allergen Box Elder <0.10 <0.10 kUA/L    Allergen C. Herbarum <0.10 <0.10 kUA/L    Allergen Cat  Dander 1.26 (H) <0.10 kUA/L    Allergen Cockroach <0.10 <0.10 kUA/L    Allergen Common Pigweed <0.10 <0.10 kUA/L    Allergen Common Ragweed 0.15 (H) <0.10 kUA/L    Allergen [place] Tree <0.10 <0.10 kUA/L    Allergen D. Farinae 0.26 (H) <0.10 kUA/L    Allergen D. Pteronyssinus 0.24 (H) <0.10 kUA/L    Allergen Dog Dander 0.28 (H) <0.10 kUA/L    Allergen Elm Tree <0.10 <0.10 kUA/L    Allergen Marsh Elder <0.10 <0.10 kUA/L    Allergen Mouse Epithelium <0.10 <0.10 kUA/L    Allergen [place] <0.10 <0.10 kUA/L    Allergen Mucor Racemosus <0.10 <0.10 kUA/L    Allergen [place] <0.10 <0.10 kUA/L    Allergen [place] Tree <0.10 <0.10 kUA/L    Allergen Pecan/Hickory <0.10 <0.10 kUA/L    Allergen Penicillium Notatum <0.10 <0.10 kUA/L    Allergen Russian Thistle <0.10 <0.10 kUA/L    Allergen [place] <0.10 <0.10 kUA/L    Allergen Solitario Grass 1.12 (H) <0.10 kUA/L    Allergen [place] Tree <0.10 <0.10 kUA/L    Allergen White Neal 0.14 (H) <0.10 kUA/L    Immunoglobulin E 29.10 2.00 - 214.00 kU/L   Vitamin D, 25-Hydroxy   Result Value Ref Range    Vitamin D, 25OH, Total 28.7 (L) 30.0 - 100.0 ng/mL             Past Medical History:    ADHD    Anxiety    Asthma (HCC)    Depression     Past Surgical History:   Procedure Laterality Date    Knee surgery Right      Social History:  Social History     Socioeconomic History    Marital status: Single   Tobacco Use    Smoking status: Never    Smokeless tobacco: Never   Vaping Use    Vaping status: Never Used   Substance and Sexual Activity    Alcohol use: Yes     Comment: occ    Drug use: Never   Other Topics Concern    Caffeine Concern No    Exercise No    Seat Belt No    Special Diet No    Stress Concern No    Weight Concern No     Family History:  Family History   Problem Relation Age of Onset    Prostate Cancer Father     Depression Mother      Allergies:  Allergies   Allergen Reactions    Cat Hair Extract OTHER (SEE COMMENTS)     Done by allergist    Pollen Extract-Tree Extract  [Pollen Extract] OTHER (SEE COMMENTS)     Done by allergist      Current Meds:  Current Outpatient Medications   Medication Sig Dispense Refill    Budesonide-Formoterol Fumarate 160-4.5 MCG/ACT Inhalation Aerosol Inhale 2 puffs into the lungs 2 (two) times daily. 1 each 2    albuterol 108 (90 Base) MCG/ACT Inhalation Aero Soln Inhale 2 puffs into the lungs every 6 (six) hours as needed for Wheezing or Shortness of Breath. 1 each 2    Meloxicam 15 MG Oral Tab Take 1 tablet (15 mg total) by mouth daily.      Zolpidem Tartrate ER 12.5 MG Oral Tab CR Take 1 tablet (12.5 mg total) by mouth nightly as needed. AT BEDTIME      clonazePAM 0.5 MG Oral Tab Take 1 tablet (0.5 mg total) by mouth daily as needed.      LO LOESTRIN FE 1 MG-10 MCG / 10 MCG Oral Tab Take 1 tablet by mouth daily.      FLUoxetine 20 MG Oral Cap Take 2 capsules (40 mg total) by mouth every morning.        Counseling given: Not Answered       REVIEW OF SYSTEMS:   CONSTITUTIONAL:  Denies unusual weight gain/loss, fever, chills, or fatigue.  HEENT:  Eyes:  Denies eye pain, visual loss, blurred vision. Denies hearing loss, sneezing, congestion, runny nose or sore throat.  INTEGUMENTARY:  Denies rashes, itching, skin lesion.  CARDIOVASCULAR:  Denies chest pain, palpitations, edema, dyspnea on exertion or at rest.  RESPIRATORY:  Denies shortness of breath, wheezing, cough or sputum.  GASTROINTESTINAL:  Denies abdominal pain, nausea, vomiting, constipation, diarrhea, or blood in stool.  GENITOURINARY: Denies dysuria, hematuria, frequency.  MUSCULOSKELETAL:  Denies weakness, muscle aches, back pain, joint pain, swelling or stiffness.  NEUROLOGICAL:  Denies headache, seizures, dizziness.  PSYCHIATRIC:  Anxiety and depression well-controlled, sees psychiatry. Denies suicidal thoughts.    EXAM:   /64 (BP Location: Left arm, Patient Position: Sitting, Cuff Size: adult)   Pulse 100   Temp 98.3 °F (36.8 °C) (Oral)   Wt 174 lb (78.9 kg)   LMP 08/12/2024    BMI 28.96 kg/m²  Estimated body mass index is 28.96 kg/m² as calculated from the following:    Height as of 7/1/24: 5' 5\" (1.651 m).    Weight as of this encounter: 174 lb (78.9 kg).   Vital signs reviewed.Appears stated age, well groomed, in no acute distress.  Physical Exam:  GEN:  Patient is alert, awake and oriented, well developed, well nourished.  HEENT:  Head:  Normocephalic, atraumatic Eyes: EOMI, PERRLA, conjunctivae clear bilaterally, no eye discharge Ears: External normal, TM clear bilaterally. Nose: patent, no nasal discharge. Throat:  No tonsillar erythema or exudate.  Mouth:  No oral lesions, good dentition.  NECK: Supple, no CLAD, no thyromegaly.  SKIN: No rashes, no skin lesion, no bruising, good turgor.  HEART:  Regular rate and rhythm, no murmurs, rubs or gallops.  LUNGS: Clear to auscultation bilterally, no rales/rhonchi/wheezing.  ABDOMEN:  Soft, nondistended, nontender, bowel sounds normal in all 4 quadrants, no masses, no hepatosplenomegaly.  BACK: No tenderness to palpation, FROM.  EXTREMITIES:  No edema, no cyanosis, no clubbing, FROM, 2+ dorsalis pedis pulses bilaterally.  NEURO:  No deficit, normal gait, strength and tone, sensory intact, normal reflexes.    ASSESSMENT AND PLAN:   1. Adult general medical examination  -Healthy diet and regular exercise.  -Annual eye exam and biannual dental visits.   -Flu vaccine in fall.    2. Mild persistent asthma without complication (HCC)  -Well-controlled, continue current medications. Follow-up 6 months.  - Budesonide-Formoterol Fumarate 160-4.5 MCG/ACT Inhalation Aerosol; Inhale 2 puffs into the lungs 2 (two) times daily.  Dispense: 1 each; Refill: 2  - albuterol 108 (90 Base) MCG/ACT Inhalation Aero Soln; Inhale 2 puffs into the lungs every 6 (six) hours as needed for Wheezing or Shortness of Breath.  Dispense: 1 each; Refill: 2    3. Seasonal allergies  -Continue OTC antihistamine and Flonase daily throughout allergy season. Avoid triggers.  Strategies for mitigating allergies reviewed.     4. Encounter for immunization  - Prevnar 20 (PCV20) [81433]      Meds & Refills for this Visit:  Requested Prescriptions     Signed Prescriptions Disp Refills    Budesonide-Formoterol Fumarate 160-4.5 MCG/ACT Inhalation Aerosol 1 each 2     Sig: Inhale 2 puffs into the lungs 2 (two) times daily.    albuterol 108 (90 Base) MCG/ACT Inhalation Aero Soln 1 each 2     Sig: Inhale 2 puffs into the lungs every 6 (six) hours as needed for Wheezing or Shortness of Breath.         Problem List:  Patient Active Problem List   Diagnosis    Mild persistent asthma without complication (HCC)    Primary insomnia    Anxiety and depression    Attention deficit hyperactivity disorder (ADHD)    Osteopenia    Vitamin D deficiency    Abnormal thyroid function test    Abnormal RBC       Monica Blakely, APRN  9/12/2024  3:49 PM

## 2024-09-13 ENCOUNTER — TELEPHONE (OUTPATIENT)
Dept: FAMILY MEDICINE CLINIC | Facility: CLINIC | Age: 34
End: 2024-09-13

## 2024-09-13 DIAGNOSIS — J45.30 MILD PERSISTENT ASTHMA WITHOUT COMPLICATION (HCC): Primary | ICD-10-CM

## 2024-09-13 NOTE — TELEPHONE ENCOUNTER
[facility]  ask[facility] RX to:  Albuterol Sulfate Aerosol or Levalbuterol Tartrate Aerosol instead of Albuterol HFA INH that was prescribed 9/12/24.

## 2024-09-20 ENCOUNTER — TELEPHONE (OUTPATIENT)
Dept: FAMILY MEDICINE CLINIC | Facility: CLINIC | Age: 34
End: 2024-09-20

## 2024-09-20 DIAGNOSIS — J45.30 MILD PERSISTENT ASTHMA WITHOUT COMPLICATION (HCC): Primary | ICD-10-CM

## 2024-09-20 NOTE — TELEPHONE ENCOUNTER
RN s/w pharmacist at [facility]    Pharmacist confirmed that pt's insurance does not cover Symbicort but listed alternatives, Fluticasone, Wixela, Salmeterol and Breo Ellipta. Will forward to Monica for review and recommendations.

## 2024-09-20 NOTE — TELEPHONE ENCOUNTER
Patient left a message stating the symbicort that Monica prescribed is not covered by her insurance and would like an alternative.

## 2024-09-21 RX ORDER — FLUTICASONE PROPIONATE AND SALMETEROL XINAFOATE 230; 21 UG/1; UG/1
1 AEROSOL, METERED RESPIRATORY (INHALATION) 2 TIMES DAILY
Qty: 3 EACH | Refills: 1 | Status: SHIPPED | OUTPATIENT
Start: 2024-09-21

## 2024-09-24 ENCOUNTER — PATIENT MESSAGE (OUTPATIENT)
Dept: FAMILY MEDICINE CLINIC | Facility: CLINIC | Age: 34
End: 2024-09-24

## 2024-09-24 DIAGNOSIS — J45.30 MILD PERSISTENT ASTHMA WITHOUT COMPLICATION (HCC): ICD-10-CM

## 2024-10-01 RX ORDER — FLUTICASONE PROPIONATE AND SALMETEROL XINAFOATE 230; 21 UG/1; UG/1
1 AEROSOL, METERED RESPIRATORY (INHALATION) 2 TIMES DAILY
Qty: 3 EACH | Refills: 1 | Status: SHIPPED | OUTPATIENT
Start: 2024-10-01

## 2024-12-21 ENCOUNTER — PATIENT MESSAGE (OUTPATIENT)
Dept: NEUROLOGY | Facility: CLINIC | Age: 34
End: 2024-12-21

## 2024-12-21 DIAGNOSIS — G43.809 VESTIBULAR MIGRAINE: Primary | ICD-10-CM

## 2024-12-24 RX ORDER — RIZATRIPTAN BENZOATE 10 MG/1
TABLET ORAL
Qty: 12 TABLET | Refills: 0 | Status: SHIPPED | OUTPATIENT
Start: 2024-12-24

## 2025-01-08 RX ORDER — LISDEXAMFETAMINE DIMESYLATE 50 MG/1
CAPSULE ORAL
COMMUNITY
Start: 2023-07-24 | End: 2025-01-09

## 2025-01-08 RX ORDER — CYCLOBENZAPRINE HCL 5 MG
TABLET ORAL
COMMUNITY
Start: 2024-12-17 | End: 2025-01-09

## 2025-01-08 RX ORDER — FLUOXETINE 40 MG/1
40 CAPSULE ORAL EVERY MORNING
COMMUNITY
Start: 2024-09-22

## 2025-01-08 RX ORDER — BENZONATATE 100 MG/1
CAPSULE ORAL
COMMUNITY
Start: 2024-10-27 | End: 2025-01-09

## 2025-01-08 RX ORDER — PREDNISONE 20 MG/1
40 TABLET ORAL DAILY
COMMUNITY
Start: 2024-10-27 | End: 2025-01-09

## 2025-01-09 ENCOUNTER — OFFICE VISIT (OUTPATIENT)
Dept: FAMILY MEDICINE CLINIC | Facility: CLINIC | Age: 35
End: 2025-01-09
Payer: COMMERCIAL

## 2025-01-09 ENCOUNTER — OFFICE VISIT (OUTPATIENT)
Dept: NEUROLOGY | Facility: CLINIC | Age: 35
End: 2025-01-09
Payer: COMMERCIAL

## 2025-01-09 VITALS
TEMPERATURE: 99 F | SYSTOLIC BLOOD PRESSURE: 108 MMHG | BODY MASS INDEX: 30 KG/M2 | HEART RATE: 93 BPM | DIASTOLIC BLOOD PRESSURE: 64 MMHG | WEIGHT: 181 LBS | OXYGEN SATURATION: 97 %

## 2025-01-09 DIAGNOSIS — J01.40 ACUTE NON-RECURRENT PANSINUSITIS: ICD-10-CM

## 2025-01-09 DIAGNOSIS — G43.101 MIGRAINE WITH AURA AND WITH STATUS MIGRAINOSUS, NOT INTRACTABLE: ICD-10-CM

## 2025-01-09 DIAGNOSIS — J06.9 VIRAL URI: Primary | ICD-10-CM

## 2025-01-09 DIAGNOSIS — G43.809 VESTIBULAR MIGRAINE: Primary | ICD-10-CM

## 2025-01-09 PROCEDURE — 3074F SYST BP LT 130 MM HG: CPT | Performed by: NURSE PRACTITIONER

## 2025-01-09 PROCEDURE — 3078F DIAST BP <80 MM HG: CPT | Performed by: NURSE PRACTITIONER

## 2025-01-09 PROCEDURE — 99214 OFFICE O/P EST MOD 30 MIN: CPT | Performed by: OTHER

## 2025-01-09 PROCEDURE — 99213 OFFICE O/P EST LOW 20 MIN: CPT | Performed by: NURSE PRACTITIONER

## 2025-01-09 RX ORDER — NIRMATRELVIR AND RITONAVIR 300-100 MG
KIT ORAL
COMMUNITY

## 2025-01-09 RX ORDER — METHYLPREDNISOLONE 4 MG/1
4 TABLET ORAL AS DIRECTED
COMMUNITY
End: 2025-01-09

## 2025-01-09 RX ORDER — NORETHINDRONE ACETATE AND ETHINYL ESTRADIOL, ETHINYL ESTRADIOL AND FERROUS FUMARATE 1MG-10(24)
1 KIT ORAL DAILY
COMMUNITY

## 2025-01-09 RX ORDER — NARATRIPTAN 2.5 MG/1
TABLET ORAL
Qty: 8 TABLET | Refills: 0 | Status: SHIPPED | OUTPATIENT
Start: 2025-01-09

## 2025-01-09 RX ORDER — MECLIZINE HYDROCHLORIDE 25 MG/1
TABLET ORAL 3 TIMES DAILY PRN
Qty: 180 TABLET | Refills: 0 | Status: SHIPPED | OUTPATIENT
Start: 2025-01-09 | End: 2025-04-09

## 2025-01-09 RX ORDER — PROCHLORPERAZINE MALEATE 10 MG
10 TABLET ORAL EVERY 6 HOURS PRN
Qty: 90 TABLET | Refills: 0 | Status: SHIPPED | OUTPATIENT
Start: 2025-01-09 | End: 2025-02-08

## 2025-01-09 NOTE — PROGRESS NOTES
[facility]  [address], [address]  [place] [address]  [phone]          Established  Follow Up Visit       Date: January 9, 2025  Patient Name: Mine Evans   MRN: [id number]  Primary physician: [address]  [place] [address]    Interval History:   --Had recurrence of migraines, complicated by vertigo.  Other aura symptoms: brain fog.  Has URI currently.  Bifrontal/temporal pain, throbbing, photophobia, poor appetite, can last up to several days' in a row.  Weather changes may be a trigger.        OUTPATIENT MEDICATIONS  Medications Ordered Prior to Encounter[1]      PHYSICAL EXAM:   [facility] 08/12/2024   General appearance: Well appearing, and in no acute distress  Skin: skin color, texture normal.  No rashes or lesions.    Head: Normocephalic, atraumatic.    Neurological exam:    Mental Status:   Attention/Concentration: intact attention on bedside test   Fund of knowledge: intact  Speech: no dysarthria or aphasia     LABS/DATA:  CBC, TSH reviewed       IMAGING:  N/A    ASSESSMENT:  The patient is a 34 year old  woman past medical history of ADHD, depression, asthma who presents for  follow up regarding vestibular migraines and migraine with confusional aura.    MRI and MRV head were both normal.     Her migraines have been described as a continuous sensation of vertigo with severe headache with photophobia, phonophobia that only improved with laying in a dark quiet room.  She is also had a scotoma and scintillations as visual aura.       Vestibular migraine  Migraine with aura with status migrainosus confusional aura, visual aura    -Preventative: Magnesium   Future considerations: Change SSRI to SNRI, Amitriptyline, Topiramate   -Abortive: Naratriptan; Compazine/Benadryl or Meclizine                 Future considerations: Eletriptan, Ubrelvy, Nurtec                 Limit non-CGRP rescue medications <2 times per week to avoid developing medication overuse headaches    -Status migrainosus: Infusion therapy; oral headache cocktail with NSAID plus Compazine/Benadryl (can also help with vertigo itself); VPA course; Prednisone course; Naratriptan course   -Neuroimaging: Completed    -Ophthalmology evaluation    -Avoid estrogen, progesterone only OCP is recommended.  We have discussed stroke risk today and in the past visits.  She has elected to remain on estrogen understanding the risk of stroke with migraine w/ aura.   -Lifestyle information provided    -Patient will let my office know if she becomes pregnant or plan to become pregnant so we can adjust/stop medication safely due to risk to fetus/fetal malformations from medications prescribed       Discussed indication, administration, dose, and side effects with patient of any medications personally prescribed. Patient was advised to let my office know if they have any questions or concerns.       Today, I personally spent 20 minutes in this case, including chart review, time spent with patient doing face to face evaluation w/ interview and exam and patient education, counseling, and time was spent in patient education, counseling, and coordination of care as described above.   Issues discussed: Diagnosis and implications on future health, benefits and side effects of present and future medications, test results as well as further testing and medications required.    This note was prepared using Dragon Medical voice recognition dictation software and as a result, errors may occur. When identified, these errors have been corrected. While every attempt is made to correct errors during dictation, discrepancies may still exist    JAKE Wright DO   Staff Physician, Neurology   1/9/2025  10:56 AM               [1]   Current Outpatient Medications on File Prior to Visit   Medication Sig Dispense Refill    Norethin-Eth Estrad-Fe Biphas (LO LOESTRIN FE) 1 MG-10 MCG / 10 MCG Oral Tab Take 1 tablet by mouth daily.      nirmatrelvir-ritonavir  (PAXLOVID, 300/100,) 300-100 MG Oral Tablet Therapy Pack TAKE 3 TABLETS BY MOUTH TWICE DAILY FOR 5 DAYS.      Rizatriptan Benzoate 10 MG Oral Tab use at onset; may repeat once after 2 hours- ONLY 2 IN 24 HOUR PERIOD MAX.  This is a 30 day supply. 12 tablet 0    fluticasone furoate-vilanterol (BREO ELLIPTA) 100-25 MCG/ACT Inhalation Aerosol Powder, Breath Activated Inhale 1 puff into the lungs daily. 3 each 1    Albuterol Sulfate 108 (90 Base) MCG/ACT Inhalation Aerosol Powder, Breath Activated Inhale 2 puffs into the lungs every 6 (six) hours as needed. 1 each 2    albuterol 108 (90 Base) MCG/ACT Inhalation Aero Soln Inhale 2 puffs into the lungs every 6 (six) hours as needed for Wheezing or Shortness of Breath. 1 each 2    Zolpidem Tartrate ER 12.5 MG Oral Tab CR Take 1 tablet (12.5 mg total) by mouth nightly as needed. AT BEDTIME      clonazePAM 0.5 MG Oral Tab Take 1 tablet (0.5 mg total) by mouth daily as needed.      FLUoxetine 20 MG Oral Cap Take 2 capsules (40 mg total) by mouth every morning.      LO LOESTRIN FE 1 MG-10 MCG / 10 MCG Oral Tab Take 1 tablet by mouth daily.       No current facility-administered medications on file prior to visit.

## 2025-01-09 NOTE — PROGRESS NOTES
Chief Complaint:   Chief Complaint   Patient presents with    Sore Throat     Started last week    Cough     Dry and wet cough    Fatigue    Nasal Congestion     Started last Tuesday, 10 day ago       HPI:   This is a 34 year old female coming in for cold symptoms x 9-10 days. She initially had a mild congestion/sore throat last Tuesday, this improved, then returned Wednesday and she has felt symptomatic since. Reports a sore throat, feels irritated from post-nasal drip. +Congestion, dry cough, mild headache at times. +Fatigue. No nausea, vomiting, diarrhea, dyspnea, chest pain. Asthma has not been exacerbated by symptoms. Using vitamin C, vitamin D, Claritin, Flonase once daily. Was taking Mucinex but this ran out. Now using Sudafed at times.     Results for orders placed or performed in visit on 08/09/24   TSH and Free T4    Collection Time: 08/09/24 11:54 AM   Result Value Ref Range    Free T4 1.1 0.8 - 1.7 ng/dL    TSH 1.492 0.550 - 4.780 mIU/mL   CBC With Differential With Platelet    Collection Time: 08/09/24 11:54 AM   Result Value Ref Range    WBC 7.0 4.0 - 11.0 x10(3) uL    RBC 3.85 3.80 - 5.30 x10(6)uL    HGB 12.8 12.0 - 16.0 g/dL    HCT 38.3 35.0 - 48.0 %    MCV 99.5 80.0 - 100.0 fL    MCH 33.2 26.0 - 34.0 pg    MCHC 33.4 31.0 - 37.0 g/dL    RDW-SD 47.2 (H) 35.1 - 46.3 fL    RDW 13.0 11.0 - 15.0 %    .0 150.0 - 450.0 10(3)uL    Neutrophil Absolute Prelim 3.24 1.50 - 7.70 x10 (3) uL    Neutrophil Absolute 3.24 1.50 - 7.70 x10(3) uL    Lymphocyte Absolute 2.89 1.00 - 4.00 x10(3) uL    Monocyte Absolute 0.47 0.10 - 1.00 x10(3) uL    Eosinophil Absolute 0.29 0.00 - 0.70 x10(3) uL    Basophil Absolute 0.12 0.00 - 0.20 x10(3) uL    Immature Granulocyte Absolute 0.01 0.00 - 1.00 x10(3) uL    Neutrophil % 46.2 %    Lymphocyte % 41.2 %    Monocyte % 6.7 %    Eosinophil % 4.1 %    Basophil % 1.7 %    Immature Granulocyte % 0.1 %   Allergens, Zone 8 [E]    Collection Time: 08/09/24 11:54 AM   Result Value Ref  Range    Allergen A. Alternata 0.37 (H) <0.10 kUA/L    Allergen Aspergillus Fumigatus <0.10 <0.10 kUA/L    Allergen Bermuda Grass 0.20 (H) <0.10 kUA/L    Allergen [place] <0.10 <0.10 kUA/L    Allergen Box Elder <0.10 <0.10 kUA/L    Allergen C. Herbarum <0.10 <0.10 kUA/L    Allergen Cat Dander 1.26 (H) <0.10 kUA/L    Allergen Cockroach <0.10 <0.10 kUA/L    Allergen Common Pigweed <0.10 <0.10 kUA/L    Allergen Common Ragweed 0.15 (H) <0.10 kUA/L    Allergen [place] Tree <0.10 <0.10 kUA/L    Allergen D. Farinae 0.26 (H) <0.10 kUA/L    Allergen D. Pteronyssinus 0.24 (H) <0.10 kUA/L    Allergen Dog Dander 0.28 (H) <0.10 kUA/L    Allergen Elm Tree <0.10 <0.10 kUA/L    Allergen Marsh Elder <0.10 <0.10 kUA/L    Allergen Mouse Epithelium <0.10 <0.10 kUA/L    Allergen [place] <0.10 <0.10 kUA/L    Allergen Mucor Racemosus <0.10 <0.10 kUA/L    Allergen [place] <0.10 <0.10 kUA/L    Allergen [place] Tree <0.10 <0.10 kUA/L    Allergen Pecan/Hickory <0.10 <0.10 kUA/L    Allergen Penicillium Notatum <0.10 <0.10 kUA/L    Allergen Russian Thistle <0.10 <0.10 kUA/L    Allergen [place] <0.10 <0.10 kUA/L    Allergen Solitario Grass 1.12 (H) <0.10 kUA/L    Allergen [place] Tree <0.10 <0.10 kUA/L    Allergen White Neal 0.14 (H) <0.10 kUA/L    Immunoglobulin E 29.10 2.00 - 214.00 kU/L   Vitamin D, 25-Hydroxy    Collection Time: 08/09/24 11:54 AM   Result Value Ref Range    Vitamin D, 25OH, Total 28.7 (L) 30.0 - 100.0 ng/mL             Past Medical History:    ADHD    Anxiety    Asthma (HCC)    Depression     Past Surgical History:   Procedure Laterality Date    Knee surgery Right      Social History:  Social History     Socioeconomic History    Marital status: Single   Tobacco Use    Smoking status: Never     Passive exposure: Never    Smokeless tobacco: Never   Vaping Use    Vaping status: Never Used   Substance and Sexual Activity    Alcohol use: Yes     Comment: occ    Drug use: Never   Other Topics Concern    Caffeine Concern No     Exercise No    Seat Belt No    Special Diet No    Stress Concern No    Weight Concern No     Family History:  Family History   Problem Relation Age of Onset    Prostate Cancer Father     Depression Mother      Allergies:  Allergies[1]  Current Meds:  Current Outpatient Medications   Medication Sig Dispense Refill    FLUoxetine HCl 40 MG Oral Cap Take 1 capsule (40 mg total) by mouth every morning.      Rizatriptan Benzoate 10 MG Oral Tab use at onset; may repeat once after 2 hours- ONLY 2 IN 24 HOUR PERIOD MAX.  This is a 30 day supply. 12 tablet 0    fluticasone furoate-vilanterol (BREO ELLIPTA) 100-25 MCG/ACT Inhalation Aerosol Powder, Breath Activated Inhale 1 puff into the lungs daily. 3 each 1    Albuterol Sulfate 108 (90 Base) MCG/ACT Inhalation Aerosol Powder, Breath Activated Inhale 2 puffs into the lungs every 6 (six) hours as needed. 1 each 2    albuterol 108 (90 Base) MCG/ACT Inhalation Aero Soln Inhale 2 puffs into the lungs every 6 (six) hours as needed for Wheezing or Shortness of Breath. 1 each 2    Zolpidem Tartrate ER 12.5 MG Oral Tab CR Take 1 tablet (12.5 mg total) by mouth nightly as needed. AT BEDTIME      clonazePAM 0.5 MG Oral Tab Take 1 tablet (0.5 mg total) by mouth daily as needed.      FLUoxetine 20 MG Oral Cap Take 2 capsules (40 mg total) by mouth every morning.      LO LOESTRIN FE 1 MG-10 MCG / 10 MCG Oral Tab Take 1 tablet by mouth daily.        Counseling given: Not Answered       REVIEW OF SYSTEMS:   CONSTITUTIONAL:  Denies unusual weight gain/loss, fever, chills. +Fatigue.  HEENT:  See HPI. Reports mild facial pressure at times.  INTEGUMENTARY:  Denies rashes, itching, skin lesion.  CARDIOVASCULAR:  Denies chest pain, palpitations, edema, dyspnea on exertion or at rest.  RESPIRATORY:  Denies shortness of breath, wheezing. +Dry cough.  GASTROINTESTINAL:  Denies abdominal pain, nausea, vomiting, constipation, diarrhea, or blood in stool.  MUSCULOSKELETAL:  Denies body  aches.  NEUROLOGICAL:  Denies seizures, dizziness. Mild intermittent headaches.    EXAM:   /64 (BP Location: Left arm, Patient Position: Sitting, Cuff Size: adult)   Pulse 93   Temp 99.4 °F (37.4 °C) (Oral)   Wt 181 lb (82.1 kg)   LMP  (Within Weeks)   SpO2 97%   BMI 30.12 kg/m²  Estimated body mass index is 30.12 kg/m² as calculated from the following:    Height as of 7/1/24: 5' 5\" (1.651 m).    Weight as of this encounter: 181 lb (82.1 kg).   Vital signs reviewed.Appears stated age, well groomed, in no acute distress.  Physical Exam:  GEN:  Patient is alert, awake and oriented, well developed, well nourished.  HEENT:  Head:  Normocephalic, atraumatic Eyes: EOMI, PERRLA, conjunctivae clear bilaterally, no eye discharge Ears: External normal, TM clear bilaterally. Nose: patent, no nasal discharge. Throat:  No tonsillar erythema or exudate.  Mouth:  No oral lesions, good dentition.  NECK: Supple, no CLAD, no thyromegaly.  SKIN: No rashes, no skin lesion, no bruising, good turgor.  HEART:  Regular rate and rhythm, no murmurs, rubs or gallops.  LUNGS: Clear to auscultation bilterally, no rales/rhonchi/wheezing.  NEURO:  No deficit, normal gait, strength and tone, sensory intact.    ASSESSMENT AND PLAN:   1. Viral URI  -Discussed that symptoms are consistent with viral infection and she is gradually improving over time. Reviewed s/s sinusitis including severe congestion, facial pressure, persistent headaches, dizziness that would warrant antibiotic use. She requests that I sent in antibiotic prescription for pharmacy to hold in case she worsens over the weekend.  -Continue symptomatic medications. May add Tylenol/ibuprofen for sore throat/headaches. Tea with honey to soothe throat, humidifier, PO fluids, rest.     2. Acute non-recurrent pansinusitis  -If she starts antibiotics should finish entire rx. Side effects reviewed with patient. Advised this will not improve scratchy throat but would treat sinus  infection symptoms only. She verbalized understanding.       Meds & Refills for this Visit:  Requested Prescriptions      No prescriptions requested or ordered in this encounter         Problem List:  Patient Active Problem List   Diagnosis    Mild persistent asthma without complication (HCC)    Primary insomnia    Anxiety and depression    Attention deficit hyperactivity disorder (ADHD)    Osteopenia    Vitamin D deficiency    Abnormal thyroid function test    Abnormal RBC       Monica Zora, APRN  1/9/2025  9:59 AM           [1]   Allergies  Allergen Reactions    Cat Hair Extract OTHER (SEE COMMENTS)     Done by allergist    Pollen Extract-Tree Extract [Pollen Extract] OTHER (SEE COMMENTS)     Done by allergist

## 2025-01-09 NOTE — PATIENT INSTRUCTIONS
Rescue: Naratriptan at first sign of migraine   Meclizine for vertigo but if this does not work, you can try Compazine/Benadryl (please do not combine Meclizine and Compazine/Benadryl within 6 hours please)     Progesterone only OCP     Magnesium oxide (if you are constipated) or glycinate - 400 mg nightly - as migraine prevention

## 2025-02-24 ENCOUNTER — OFFICE VISIT (OUTPATIENT)
Dept: FAMILY MEDICINE CLINIC | Facility: CLINIC | Age: 35
End: 2025-02-24
Payer: COMMERCIAL

## 2025-02-24 VITALS
OXYGEN SATURATION: 98 % | WEIGHT: 186 LBS | TEMPERATURE: 99 F | DIASTOLIC BLOOD PRESSURE: 78 MMHG | HEART RATE: 78 BPM | HEIGHT: 65 IN | SYSTOLIC BLOOD PRESSURE: 122 MMHG | BODY MASS INDEX: 30.99 KG/M2

## 2025-02-24 DIAGNOSIS — R79.89 ABNORMAL THYROID BLOOD TEST: ICD-10-CM

## 2025-02-24 DIAGNOSIS — R63.5 WEIGHT GAIN: Primary | ICD-10-CM

## 2025-02-24 PROCEDURE — 3008F BODY MASS INDEX DOCD: CPT | Performed by: NURSE PRACTITIONER

## 2025-02-24 PROCEDURE — 99213 OFFICE O/P EST LOW 20 MIN: CPT | Performed by: NURSE PRACTITIONER

## 2025-02-24 PROCEDURE — 3078F DIAST BP <80 MM HG: CPT | Performed by: NURSE PRACTITIONER

## 2025-02-24 PROCEDURE — 3074F SYST BP LT 130 MM HG: CPT | Performed by: NURSE PRACTITIONER

## 2025-02-24 NOTE — PROGRESS NOTES
Chief Complaint:   Chief Complaint   Patient presents with    Weight Problem       HPI:   This is a 34 year old female coming in for weight gain of about 30 lbs in the past year. She typically exercises more in the summer and gains muscle weight, then loses weight in the winter when she is more sedentary. That did not happen this year. She has not been exercising. Thinks her diet is OK, notes she gets little protein, <5 servings per day of fruits/vegetables, and reports 60-70% of her diet is carbohydrates. Working on cutting out soda. Has been taking fluoxetine 40mg daily x 10-15 years. Sees psychiatry. Skips her periods by taking her birth control continuously. Otherwise feels OK.    Results for orders placed or performed in visit on 08/09/24   TSH and Free T4    Collection Time: 08/09/24 11:54 AM   Result Value Ref Range    Free T4 1.1 0.8 - 1.7 ng/dL    TSH 1.492 0.550 - 4.780 mIU/mL   CBC With Differential With Platelet    Collection Time: 08/09/24 11:54 AM   Result Value Ref Range    WBC 7.0 4.0 - 11.0 x10(3) uL    RBC 3.85 3.80 - 5.30 x10(6)uL    HGB 12.8 12.0 - 16.0 g/dL    HCT 38.3 35.0 - 48.0 %    MCV 99.5 80.0 - 100.0 fL    MCH 33.2 26.0 - 34.0 pg    MCHC 33.4 31.0 - 37.0 g/dL    RDW-SD 47.2 (H) 35.1 - 46.3 fL    RDW 13.0 11.0 - 15.0 %    .0 150.0 - 450.0 10(3)uL    Neutrophil Absolute Prelim 3.24 1.50 - 7.70 x10 (3) uL    Neutrophil Absolute 3.24 1.50 - 7.70 x10(3) uL    Lymphocyte Absolute 2.89 1.00 - 4.00 x10(3) uL    Monocyte Absolute 0.47 0.10 - 1.00 x10(3) uL    Eosinophil Absolute 0.29 0.00 - 0.70 x10(3) uL    Basophil Absolute 0.12 0.00 - 0.20 x10(3) uL    Immature Granulocyte Absolute 0.01 0.00 - 1.00 x10(3) uL    Neutrophil % 46.2 %    Lymphocyte % 41.2 %    Monocyte % 6.7 %    Eosinophil % 4.1 %    Basophil % 1.7 %    Immature Granulocyte % 0.1 %   Allergens, Zone 8 [E]    Collection Time: 08/09/24 11:54 AM   Result Value Ref Range    Allergen A. Alternata 0.37 (H) <0.10 kUA/L    Allergen  Aspergillus Fumigatus <0.10 <0.10 kUA/L    Allergen Bermuda Grass 0.20 (H) <0.10 kUA/L    Allergen Hurley <0.10 <0.10 kUA/L    Allergen Box Elder <0.10 <0.10 kUA/L    Allergen C. Herbarum <0.10 <0.10 kUA/L    Allergen Cat Dander 1.26 (H) <0.10 kUA/L    Allergen Cockroach <0.10 <0.10 kUA/L    Allergen Common Pigweed <0.10 <0.10 kUA/L    Allergen Common Ragweed 0.15 (H) <0.10 kUA/L    Allergen [place] Tree <0.10 <0.10 kUA/L    Allergen D. Farinae 0.26 (H) <0.10 kUA/L    Allergen D. Pteronyssinus 0.24 (H) <0.10 kUA/L    Allergen Dog Dander 0.28 (H) <0.10 kUA/L    Allergen Elm Tree <0.10 <0.10 kUA/L    Allergen Marsh Elder <0.10 <0.10 kUA/L    Allergen Mouse Epithelium <0.10 <0.10 kUA/L    Allergen Mountain Eclectic <0.10 <0.10 kUA/L    Allergen Mucor Racemosus <0.10 <0.10 kUA/L    Allergen Thurman <0.10 <0.10 kUA/L    Allergen [place] Tree <0.10 <0.10 kUA/L    Allergen Pecan/Hickory <0.10 <0.10 kUA/L    Allergen Penicillium Notatum <0.10 <0.10 kUA/L    Allergen Russian Thistle <0.10 <0.10 kUA/L    Allergen Sunman <0.10 <0.10 kUA/L    Allergen Solitario Grass 1.12 (H) <0.10 kUA/L    Allergen Nixon Tree <0.10 <0.10 kUA/L    Allergen White Neal 0.14 (H) <0.10 kUA/L    Immunoglobulin E 29.10 2.00 - 214.00 kU/L   Vitamin D, 25-Hydroxy    Collection Time: 08/09/24 11:54 AM   Result Value Ref Range    Vitamin D, 25OH, Total 28.7 (L) 30.0 - 100.0 ng/mL             Past Medical History:    ADHD    Anxiety    Asthma (HCC)    Depression     Past Surgical History:   Procedure Laterality Date    Knee surgery Right      Social History:  Social History     Socioeconomic History    Marital status: Single   Tobacco Use    Smoking status: Never     Passive exposure: Never    Smokeless tobacco: Never   Vaping Use    Vaping status: Never Used   Substance and Sexual Activity    Alcohol use: Yes     Comment: occ    Drug use: Never   Other Topics Concern    Caffeine Concern No    Exercise No    Seat Belt No    Special Diet No    Stress  Concern No    Weight Concern Yes     Social Drivers of Health     Food Insecurity: No Food Insecurity (2/24/2025)    NCSS - Food Insecurity     Worried About Running Out of Food in the Last Year: No     Ran Out of Food in the Last Year: No   Transportation Needs: No Transportation Needs (2/24/2025)    NCSS - Transportation     Lack of Transportation: No   Housing Stability: Not At Risk (2/24/2025)    NCSS - Housing/Utilities     Has Housing: Yes     Worried About Losing Housing: No     Unable to Get Utilities: No     Family History:  Family History   Problem Relation Age of Onset    Prostate Cancer Father     Depression Mother      Allergies:  Allergies[1]  Current Meds:  Current Outpatient Medications   Medication Sig Dispense Refill    Norethin-Eth Estrad-Fe Biphas (LO LOESTRIN FE) 1 MG-10 MCG / 10 MCG Oral Tab Take 1 tablet by mouth daily.      Naratriptan HCl 2.5 MG Oral Tab Use at onset; may repeat once after 4 hours- ONLY 2 IN 24 HOUR PERIOD MAX. This is a 30 day supply. 8 tablet 0    meclizine 25 MG Oral Tab Take 0.5-1 tablets (12.5-25 mg total) by mouth 3 (three) times daily as needed for Nausea or Dizziness. 180 tablet 0    fluticasone furoate-vilanterol (BREO ELLIPTA) 100-25 MCG/ACT Inhalation Aerosol Powder, Breath Activated Inhale 1 puff into the lungs daily. 3 each 1    Albuterol Sulfate 108 (90 Base) MCG/ACT Inhalation Aerosol Powder, Breath Activated Inhale 2 puffs into the lungs every 6 (six) hours as needed. 1 each 2    albuterol 108 (90 Base) MCG/ACT Inhalation Aero Soln Inhale 2 puffs into the lungs every 6 (six) hours as needed for Wheezing or Shortness of Breath. 1 each 2    Zolpidem Tartrate ER 12.5 MG Oral Tab CR Take 1 tablet (12.5 mg total) by mouth nightly as needed. AT BEDTIME      clonazePAM 0.5 MG Oral Tab Take 1 tablet (0.5 mg total) by mouth daily as needed.      FLUoxetine 20 MG Oral Cap Take 2 capsules (40 mg total) by mouth every morning.        Counseling given: Not Answered        REVIEW OF SYSTEMS:   CONSTITUTIONAL:  Denies fever, chills, or fatigue. Weight gain.  CARDIOVASCULAR:  Denies chest pain, palpitations, edema, dyspnea on exertion or at rest.  RESPIRATORY:  Denies shortness of breath, wheezing, cough or sputum.  GASTROINTESTINAL:  Denies abdominal pain, vomiting, constipation, diarrhea, or blood in stool. Occasional nausea.  NEUROLOGICAL:  Denies headache, seizures, dizziness.    EXAM:   /78 (BP Location: Right arm, Patient Position: Sitting, Cuff Size: adult)   Pulse 78   Temp 98.6 °F (37 °C) (Temporal)   Ht 5' 5\" (1.651 m)   Wt 186 lb (84.4 kg)   LMP 02/14/2025 (Approximate)   SpO2 98%   BMI 30.95 kg/m²  Estimated body mass index is 30.95 kg/m² as calculated from the following:    Height as of this encounter: 5' 5\" (1.651 m).    Weight as of this encounter: 186 lb (84.4 kg).   Vital signs reviewed.Appears stated age, well groomed, in no acute distress.  Physical Exam:  GEN:  Patient is alert, awake and oriented, well developed, well nourished.  SKIN: No rashes, no skin lesion, no bruising, good turgor.  HEART:  Regular rate and rhythm, no murmurs, rubs or gallops.  LUNGS: Clear to auscultation bilterally, no rales/rhonchi/wheezing.  NEURO:  No deficit, normal gait, strength and tone, sensory intact.    ASSESSMENT AND PLAN:   1. Weight gain  -Will check labs below.  -Discussed goal for exercise of 30 minutes per day 4-5 days per week. Discussed types of exercise recommended.  -Recommended plate method and printed information for patient.  -Mediterranean-style diet reviewed and information printed for patient.  -Referred to dietician.  -Consider SSRI as cause, will try diet and exercise above and if no improvement in 3-6 months consider changing to bupropion.   - Hemoglobin A1C; Future  - TSH and Free T4 [E]; Future  - Thyroid Peroxidase (TPO) AB [E]; Future  - Thyroid Antithyroglobulin AB; Future  - Insulin [E]; Future  - Cortisol [E]; Future  - DIETITIAN EDUCATION  NON-DIABETES, DIET (INTERNAL)    2. Abnormal thyroid blood test  -Hx of this, will check thyroid antibodies.  - TSH and Free T4 [E]; Future  - Thyroid Peroxidase (TPO) AB [E]; Future  - Thyroid Antithyroglobulin AB; Future      Meds & Refills for this Visit:  Requested Prescriptions      No prescriptions requested or ordered in this encounter         Problem List:  Patient Active Problem List   Diagnosis    Mild persistent asthma without complication (HCC)    Primary insomnia    Anxiety and depression    Attention deficit hyperactivity disorder (ADHD)    Osteopenia    Vitamin D deficiency    Abnormal thyroid function test    Abnormal RBC       Monica Blakely, APRN  2/24/2025  3:21 PM         [1]   Allergies  Allergen Reactions    Cat Hair Extract OTHER (SEE COMMENTS)     Done by allergist    Pollen Extract-Tree Extract [Pollen Extract] OTHER (SEE COMMENTS)     Done by allergist

## 2025-03-01 ENCOUNTER — LAB ENCOUNTER (OUTPATIENT)
Dept: LAB | Age: 35
End: 2025-03-01
Attending: NURSE PRACTITIONER
Payer: COMMERCIAL

## 2025-03-01 DIAGNOSIS — R63.5 WEIGHT GAIN: ICD-10-CM

## 2025-03-01 DIAGNOSIS — R79.89 ABNORMAL THYROID BLOOD TEST: ICD-10-CM

## 2025-03-01 LAB
CORTIS SERPL-MCNC: 18.2 UG/DL
EST. AVERAGE GLUCOSE BLD GHB EST-MCNC: 103 MG/DL (ref 68–126)
HBA1C MFR BLD: 5.2 % (ref ?–5.7)
INSULIN SERPL-ACNC: 13.4 MU/L (ref 3–25)
T4 FREE SERPL-MCNC: 1.1 NG/DL (ref 0.8–1.7)
THYROGLOB SERPL-MCNC: 173 U/ML (ref ?–60)
THYROPEROXIDASE AB SERPL-ACNC: 946 U/ML (ref ?–60)
TSI SER-ACNC: 1.75 UIU/ML (ref 0.55–4.78)

## 2025-03-01 PROCEDURE — 83036 HEMOGLOBIN GLYCOSYLATED A1C: CPT

## 2025-03-01 PROCEDURE — 86376 MICROSOMAL ANTIBODY EACH: CPT

## 2025-03-01 PROCEDURE — 83525 ASSAY OF INSULIN: CPT

## 2025-03-01 PROCEDURE — 84439 ASSAY OF FREE THYROXINE: CPT

## 2025-03-01 PROCEDURE — 84443 ASSAY THYROID STIM HORMONE: CPT

## 2025-03-01 PROCEDURE — 86800 THYROGLOBULIN ANTIBODY: CPT

## 2025-03-01 PROCEDURE — 36415 COLL VENOUS BLD VENIPUNCTURE: CPT

## 2025-03-01 PROCEDURE — 82533 TOTAL CORTISOL: CPT

## 2025-06-26 ENCOUNTER — OFFICE VISIT (OUTPATIENT)
Dept: NEUROLOGY | Facility: CLINIC | Age: 35
End: 2025-06-26
Payer: COMMERCIAL

## 2025-06-26 DIAGNOSIS — G43.101 MIGRAINE WITH AURA AND WITH STATUS MIGRAINOSUS, NOT INTRACTABLE: Primary | ICD-10-CM

## 2025-06-26 DIAGNOSIS — G43.809 VESTIBULAR MIGRAINE: ICD-10-CM

## 2025-06-26 PROCEDURE — 99214 OFFICE O/P EST MOD 30 MIN: CPT | Performed by: OTHER

## 2025-06-26 RX ORDER — FLUOXETINE HYDROCHLORIDE 40 MG/1
40 CAPSULE ORAL EVERY MORNING
COMMUNITY
Start: 2025-06-14

## 2025-06-26 RX ORDER — ELETRIPTAN HYDROBROMIDE 40 MG/1
TABLET, FILM COATED ORAL
Qty: 8 TABLET | Refills: 0 | Status: SHIPPED | OUTPATIENT
Start: 2025-06-26

## 2025-06-26 NOTE — PATIENT INSTRUCTIONS
Change Naratriptan to Eletriptan as needed    If this does not work, please message or call me to [facility]

## 2025-06-26 NOTE — PROGRESS NOTES
[facility]  [address], [address]  [place] [place] [address]  [phone]          Established  Follow Up Visit       Date: June 26, 2025  Patient Name: Mine Evans   MRN: [id number]  Primary physician: [address]  [place] [address]    Interval History:   -- No migraines since last office visit.  Naratriptan helps but does not resolve migraine, which resolves after 1-1.5 days on its own.  Otherwise feeling well.        OUTPATIENT MEDICATIONS  Medications Ordered Prior to Encounter[1]      PHYSICAL EXAM:   LMP 02/14/2025 (Approximate)   General appearance: Well appearing, and in no acute distress  Skin: skin color, texture normal.  No rashes or lesions.    Head: Normocephalic, atraumatic.    Neurological exam:    Mental Status:   Attention/Concentration: intact attention on bedside test   Fund of knowledge: intact  Speech: no dysarthria or aphasia       LABS/DATA:  No new pertinent labs      IMAGING:  No new neuroimaging    ASSESSMENT:  The patient is a 34 year old woman past medical history of ADHD, depression, asthma who presents for  follow up regarding vestibular migraines and migraine with confusional aura.  No migraine since last office visit.  MRI and MRV head were both normal.     Her migraines have been described as a continuous sensation of vertigo with severe headache with photophobia, phonophobia that only improved with laying in a dark quiet room.  She is also had a scotoma and scintillations as visual aura.       Vestibular migraine  Migraine with aura with status migrainosus confusional aura, visual aura    -Preventative: Magnesium   Future considerations: Change SSRI to SNRI, Amitriptyline, Topiramate   -Abortive: Change Naratriptan to Eletriptan                 Future considerations: Nurtec due to longer duration; Ubrelvy                 Limit non-CGRP rescue medications <2 times per week to avoid developing medication overuse headaches   -Status  migrainosus: Infusion therapy; oral headache cocktail with NSAID plus Compazine/Benadryl (can also help with vertigo itself); VPA course; Prednisone course; Naratriptan course   -Neuroimaging: Completed    -Ophthalmology evaluation    -Avoid estrogen.  Progesterone only OCP is recommended.  We have discussed stroke risk extensively in migraine with aura and estrogen supplementation.  She has elected to remain on estrogen understanding the risk of stroke with migraine w/ aura. d    -Patient will let my office know if she becomes pregnant or plan to become pregnant so we can adjust/stop medication safely due to risk to fetus/fetal malformations from medications prescribed     Follow up: 6 months     Discussed indication, administration, dose, and side effects with patient of any medications personally prescribed. Patient was advised to let my office know if they have any questions or concerns.       Today, I personally spent 20 minutes in this case, including chart review, time spent with patient doing face to face evaluation w/ interview and exam and patient education, counseling, and time was spent in patient education, counseling, and coordination of care as described above.   Issues discussed: Diagnosis and implications on future health, benefits and side effects of present and future medications, test results as well as further testing and medications required.    This note was prepared using Dragon Medical voice recognition dictation software and as a result, errors may occur. When identified, these errors have been corrected. While every attempt is made to correct errors during dictation, discrepancies may still exist    JAKE Wright DO   Staff Physician, Neurology   6/26/2025  3:51 PM               [1]   Current Outpatient Medications on File Prior to Visit   Medication Sig Dispense Refill    FLUoxetine HCl 40 MG Oral Cap Take 1 capsule (40 mg total) by mouth every morning.      zolpidem 10 MG Oral Tab Take 1  tablet (10 mg total) by mouth nightly as needed. AT BEDTIME      Norethin-Eth Estrad-Fe Biphas (LO LOESTRIN FE) 1 MG-10 MCG / 10 MCG Oral Tab Take 1 tablet by mouth daily.      Naratriptan HCl 2.5 MG Oral Tab Use at onset; may repeat once after 4 hours- ONLY 2 IN 24 HOUR PERIOD MAX. This is a 30 day supply. 8 tablet 0    fluticasone furoate-vilanterol (BREO ELLIPTA) 100-25 MCG/ACT Inhalation Aerosol Powder, Breath Activated Inhale 1 puff into the lungs daily. 3 each 1    Albuterol Sulfate 108 (90 Base) MCG/ACT Inhalation Aerosol Powder, Breath Activated Inhale 2 puffs into the lungs every 6 (six) hours as needed. 1 each 2    albuterol 108 (90 Base) MCG/ACT Inhalation Aero Soln Inhale 2 puffs into the lungs every 6 (six) hours as needed for Wheezing or Shortness of Breath. 1 each 2    clonazePAM 0.5 MG Oral Tab Take 1 tablet (0.5 mg total) by mouth daily as needed.      Zolpidem Tartrate ER 12.5 MG Oral Tab CR Take 1 tablet (12.5 mg total) by mouth nightly as needed. AT BEDTIME (Patient not taking: Reported on 6/26/2025)      FLUoxetine 20 MG Oral Cap Take 2 capsules (40 mg total) by mouth every morning. (Patient not taking: Reported on 6/26/2025)       No current facility-administered medications on file prior to visit.

## 2025-07-28 ENCOUNTER — OFFICE VISIT (OUTPATIENT)
Dept: FAMILY MEDICINE CLINIC | Facility: CLINIC | Age: 35
End: 2025-07-28
Payer: COMMERCIAL

## 2025-07-28 VITALS
BODY MASS INDEX: 26.84 KG/M2 | WEIGHT: 171 LBS | SYSTOLIC BLOOD PRESSURE: 100 MMHG | HEIGHT: 67 IN | RESPIRATION RATE: 16 BRPM | TEMPERATURE: 98 F | DIASTOLIC BLOOD PRESSURE: 64 MMHG | HEART RATE: 92 BPM | OXYGEN SATURATION: 98 %

## 2025-07-28 DIAGNOSIS — M54.2 NECK PAIN: Primary | ICD-10-CM

## 2025-07-28 DIAGNOSIS — M54.12 CERVICAL RADICULOPATHY: ICD-10-CM

## 2025-07-28 PROCEDURE — 3078F DIAST BP <80 MM HG: CPT | Performed by: NURSE PRACTITIONER

## 2025-07-28 PROCEDURE — 99213 OFFICE O/P EST LOW 20 MIN: CPT | Performed by: NURSE PRACTITIONER

## 2025-07-28 PROCEDURE — 3008F BODY MASS INDEX DOCD: CPT | Performed by: NURSE PRACTITIONER

## 2025-07-28 PROCEDURE — 3074F SYST BP LT 130 MM HG: CPT | Performed by: NURSE PRACTITIONER

## 2025-07-28 RX ORDER — NAPROXEN 500 MG/1
500 TABLET ORAL 2 TIMES DAILY WITH MEALS
Qty: 28 TABLET | Refills: 0 | Status: SHIPPED | OUTPATIENT
Start: 2025-07-28 | End: 2025-08-11

## 2025-07-28 RX ORDER — CYCLOBENZAPRINE HCL 10 MG
10 TABLET ORAL
COMMUNITY
Start: 2025-07-25

## 2025-07-28 NOTE — PROGRESS NOTES
Chief Complaint:   Chief Complaint   Patient presents with    Neck Pain     Pinched nerve       HPI:   This is a 34 year old female coming in for pain in left neck x 10 days. Started when she woke up from sleep that morning. No known injury. Worse with turning head toward left. Feels stiff, spasms. Pain now is 6/10. Using ibuprofen 600mg Q6H and Flexeril PRN. Went to  7/25 and they prescribed Flexeril. Saw chiropractor as well. Has not been stretching neck because she is scared to move it. Sometimes gets tingling down L arm when she moves a certain way.    Results for orders placed or performed in visit on 03/01/25   Hemoglobin A1C    Collection Time: 03/01/25 10:49 AM   Result Value Ref Range    HgbA1C 5.2 <5.7 %    Estimated Average Glucose 103 68 - 126 mg/dL   TSH and Free T4 [E]    Collection Time: 03/01/25 10:49 AM   Result Value Ref Range    Free T4 1.1 0.8 - 1.7 ng/dL    TSH 1.754 0.550 - 4.780 uIU/mL   Thyroid Peroxidase (TPO) AB [E]    Collection Time: 03/01/25 10:49 AM   Result Value Ref Range    Anti-Thyroperoxidase 946 (H) <60 U/mL   Thyroid Antithyroglobulin AB    Collection Time: 03/01/25 10:49 AM   Result Value Ref Range    Anti-Thyroglobulin 173 (H) <60 U/mL   Insulin [E]    Collection Time: 03/01/25 10:49 AM   Result Value Ref Range    Insulin 13.4 3.0 - 25.0 mU/L   Cortisol [E]    Collection Time: 03/01/25 10:49 AM   Result Value Ref Range    Cortisol 18.2 ug/dL             Past Medical History[1]  Past Surgical History[2]  Social History:  Short Social Hx on File[3]  Family History:  Family History[4]  Allergies:  Allergies[5]  Current Meds:  Current Medications[6]   Counseling given: Not Answered       REVIEW OF SYSTEMS:   CONSTITUTIONAL:  Denies unusual weight gain/loss, fever, chills, or fatigue.  CARDIOVASCULAR:  Denies chest pain, palpitations, edema, dyspnea on exertion or at rest.  RESPIRATORY:  Denies shortness of breath, wheezing, cough or sputum.  MUSCULOSKELETAL:  See  HPI.  NEUROLOGICAL:  Denies seizures, dizziness. Having some headaches. Hx migraines. None severe.    EXAM:   /64   Pulse 92   Temp 97.7 °F (36.5 °C) (Temporal)   Resp 16   Ht 5' 7\" (1.702 m)   Wt 171 lb (77.6 kg)   LMP 07/07/2025 (Approximate)   SpO2 98%   BMI 26.78 kg/m²  Estimated body mass index is 26.78 kg/m² as calculated from the following:    Height as of this encounter: 5' 7\" (1.702 m).    Weight as of this encounter: 171 lb (77.6 kg).   Vital signs reviewed.Appears stated age, well groomed, in no acute distress.  Physical Exam:  GEN:  Patient is alert, awake and oriented, well developed, well nourished.  NECK: Trapezius muscles tight to palpation BL, L>R. Mild discomfort on palpation of cervical spine. Limited ROM.   SKIN: No rashes, no skin lesion, no bruising, good turgor.  HEART:  Regular rate and rhythm, no murmurs, rubs or gallops.  LUNGS: Clear to auscultation bilterally, no rales/rhonchi/wheezing.  NEURO:  No deficit, normal gait, strength and tone, sensory intact.    ASSESSMENT AND PLAN:   1. Neck pain  -Stop ibuprofen and start naproxen BID. If no improvement with this can consider Medrol DosePak. To take with food, no other NSAIDs while on that medication. May use Tylenol PRN for breakthrough pain.  -Heating pad followed by stretches as printed.  -Referred to PT.  -Continue Flexeril TID PRN.   - naproxen 500 MG Oral Tab; Take 1 tablet (500 mg total) by mouth 2 (two) times daily with meals for 14 days.  Dispense: 28 tablet; Refill: 0  - PHYSICAL THERAPY - INTERNAL    2. Cervical radiculopathy  - naproxen 500 MG Oral Tab; Take 1 tablet (500 mg total) by mouth 2 (two) times daily with meals for 14 days.  Dispense: 28 tablet; Refill: 0  - PHYSICAL THERAPY - INTERNAL      Meds & Refills for this Visit:  Requested Prescriptions     Signed Prescriptions Disp Refills    naproxen 500 MG Oral Tab 28 tablet 0     Sig: Take 1 tablet (500 mg total) by mouth 2 (two) times daily with meals for 14  days.         Problem List:  Problem List[7]    Monica Blakely, APRN  7/28/2025  2:42 PM         [1]   Past Medical History:   ADHD    Anxiety    Asthma (HCC)    Depression   [2]   Past Surgical History:  Procedure Laterality Date    Knee surgery Right    [3]   Social History  Socioeconomic History    Marital status: Single   Tobacco Use    Smoking status: Never     Passive exposure: Never    Smokeless tobacco: Never   Vaping Use    Vaping status: Never Used   Substance and Sexual Activity    Alcohol use: Yes     Comment: occ    Drug use: Never   Other Topics Concern    Caffeine Concern No    Exercise No    Seat Belt No    Special Diet No    Stress Concern No    Weight Concern Yes     Social Drivers of Health     Food Insecurity: No Food Insecurity (7/28/2025)    NCSS - Food Insecurity     Worried About Running Out of Food in the Last Year: No     Ran Out of Food in the Last Year: No   Transportation Needs: No Transportation Needs (7/28/2025)    NCSS - Transportation     Lack of Transportation: No   Housing Stability: Not At Risk (7/28/2025)    NCSS - Housing/Utilities     Has Housing: Yes     Worried About Losing Housing: No     Unable to Get Utilities: No   [4]   Family History  Problem Relation Age of Onset    Prostate Cancer Father     Depression Mother    [5]   Allergies  Allergen Reactions    Cat Hair Extract OTHER (SEE COMMENTS)     Done by allergist    Pollen Extract-Tree Extract [Pollen Extract] OTHER (SEE COMMENTS)     Done by allergist    [6]   Current Outpatient Medications   Medication Sig Dispense Refill    cyclobenzaprine 10 MG Oral Tab Take 1 tablet (10 mg total) by mouth.      naproxen 500 MG Oral Tab Take 1 tablet (500 mg total) by mouth 2 (two) times daily with meals for 14 days. 28 tablet 0    FLUoxetine HCl 40 MG Oral Cap Take 1 capsule (40 mg total) by mouth every morning.      Eletriptan Hydrobromide 40 MG Oral Tab Take one tablet at onset of migraine; make take one additional tablet after  4 hours- ONLY 2 tabs IN 24 HOUR PERIOD MAX.  This is a 30 day supply. 8 tablet 0    zolpidem 10 MG Oral Tab Take 1 tablet (10 mg total) by mouth nightly as needed. AT BEDTIME      Norethin-Eth Estrad-Fe Biphas (LO LOESTRIN FE) 1 MG-10 MCG / 10 MCG Oral Tab Take 1 tablet by mouth daily.      fluticasone furoate-vilanterol (BREO ELLIPTA) 100-25 MCG/ACT Inhalation Aerosol Powder, Breath Activated Inhale 1 puff into the lungs daily. 3 each 1    Albuterol Sulfate 108 (90 Base) MCG/ACT Inhalation Aerosol Powder, Breath Activated Inhale 2 puffs into the lungs every 6 (six) hours as needed. 1 each 2    albuterol 108 (90 Base) MCG/ACT Inhalation Aero Soln Inhale 2 puffs into the lungs every 6 (six) hours as needed for Wheezing or Shortness of Breath. 1 each 2    clonazePAM 0.5 MG Oral Tab Take 1 tablet (0.5 mg total) by mouth daily as needed.      FLUoxetine 20 MG Oral Cap Take 2 capsules (40 mg total) by mouth every morning.     [7]   Patient Active Problem List  Diagnosis    Mild persistent asthma without complication (HCC)    Primary insomnia    Anxiety and depression    Attention deficit hyperactivity disorder (ADHD)    Osteopenia    Vitamin D deficiency    Abnormal thyroid function test    Abnormal RBC

## 2025-08-11 DIAGNOSIS — J45.30 MILD PERSISTENT ASTHMA WITHOUT COMPLICATION (HCC): ICD-10-CM

## 2025-08-13 RX ORDER — FLUTICASONE FUROATE AND VILANTEROL 100; 25 UG/1; UG/1
1 POWDER RESPIRATORY (INHALATION) DAILY
Qty: 180 EACH | Refills: 3 | Status: SHIPPED | OUTPATIENT
Start: 2025-08-13

## (undated) NOTE — LETTER
Date & Time: 7/30/2023, 4:17 PM  Patient: Carmen Woodson  Encounter Provider(s):    MARCELLO Munoz       To Whom It May Concern:    Carmen Woodson was seen and treated in our department on 7/30/2023. Please excuse her from work 7/30/2023 and 7/31/2023.     .    If you have any questions or concerns, please do not hesitate to call.        _____________________________  Physician/APC Signature

## (undated) NOTE — LETTER
8/3/2023          To Whom It May Concern:    Alma Rosa Grijalva is currently under my medical care and may not return to work for the next 3 weeks. She will be reevaluated by me at that time. If you require additional information please contact our office.         Sincerely,    Milan Mckeon MD

## (undated) NOTE — LETTER
Date: 7/31/2023    Patient Name: Karsten Sky          To Whom it may concern: This letter has been written at the patient's request. The above patient was seen at the [facility] for treatment of a medical condition. This patient should be excused from attending work from 7/31/23-8/13/2023 due to rib fracture.         Sincerely,        MARCELLO Alvarez

## (undated) NOTE — LETTER
Date: 5/8/2023    Patient Name: Kristi Gaona          To Whom it may concern: This letter has been written at the patient's request. The above patient was seen at the [facility] for treatment of a medical condition. This patient should be excused from attending work from 5/5-5/8/2023. She may return 5/9/2023 on light-duty (no lifting >5 lbs, no carrying objects >5 lbs, increased rest periods).         Sincerely,        MARCELLO Spence

## (undated) NOTE — LETTER
2/7/2024          To Whom It May Concern:    Mine Evans is currently under my medical care and may return to work with no restrictions.     If you require additional information please contact our office.        Sincerely,    ALISHA LANDRY MD

## (undated) NOTE — LETTER
Date: 5/8/2023    Patient Name: Kristal Wilson          To Whom it may concern: This letter has been written at the patient's request. The above patient was seen at the [facility] for treatment of a medical condition. This patient should be excused from school due to injury. She was not able to study for a quiz this weekend due to medication.          Sincerely,        MARCELLO Martinez

## (undated) NOTE — LETTER
Date: 5/8/2023    Patient Name: Kristal Wilson          To Whom it may concern: This letter has been written at the patient's request. The above patient was seen at the [facility] for treatment of a medical condition. This patient should be excused from participating in the race on 5/13 due to injury. She is not able to run at this time.       Sincerely,        MARCELLO Martinez